# Patient Record
Sex: FEMALE | Race: BLACK OR AFRICAN AMERICAN | NOT HISPANIC OR LATINO | Employment: FULL TIME | URBAN - METROPOLITAN AREA
[De-identification: names, ages, dates, MRNs, and addresses within clinical notes are randomized per-mention and may not be internally consistent; named-entity substitution may affect disease eponyms.]

---

## 2020-09-29 ENCOUNTER — LAB VISIT (OUTPATIENT)
Dept: PRIMARY CARE CLINIC | Facility: OTHER | Age: 70
End: 2020-09-29
Attending: INTERNAL MEDICINE
Payer: OTHER GOVERNMENT

## 2020-09-29 DIAGNOSIS — Z03.818 ENCOUNTER FOR OBSERVATION FOR SUSPECTED EXPOSURE TO OTHER BIOLOGICAL AGENTS RULED OUT: ICD-10-CM

## 2020-09-29 PROCEDURE — U0003 INFECTIOUS AGENT DETECTION BY NUCLEIC ACID (DNA OR RNA); SEVERE ACUTE RESPIRATORY SYNDROME CORONAVIRUS 2 (SARS-COV-2) (CORONAVIRUS DISEASE [COVID-19]), AMPLIFIED PROBE TECHNIQUE, MAKING USE OF HIGH THROUGHPUT TECHNOLOGIES AS DESCRIBED BY CMS-2020-01-R: HCPCS

## 2020-09-30 LAB — SARS-COV-2 RNA RESP QL NAA+PROBE: NOT DETECTED

## 2020-10-20 ENCOUNTER — LAB VISIT (OUTPATIENT)
Dept: PRIMARY CARE CLINIC | Facility: OTHER | Age: 70
End: 2020-10-20
Attending: INTERNAL MEDICINE
Payer: OTHER GOVERNMENT

## 2020-10-20 DIAGNOSIS — Z03.818 ENCOUNTER FOR OBSERVATION FOR SUSPECTED EXPOSURE TO OTHER BIOLOGICAL AGENTS RULED OUT: ICD-10-CM

## 2020-10-20 PROCEDURE — U0003 INFECTIOUS AGENT DETECTION BY NUCLEIC ACID (DNA OR RNA); SEVERE ACUTE RESPIRATORY SYNDROME CORONAVIRUS 2 (SARS-COV-2) (CORONAVIRUS DISEASE [COVID-19]), AMPLIFIED PROBE TECHNIQUE, MAKING USE OF HIGH THROUGHPUT TECHNOLOGIES AS DESCRIBED BY CMS-2020-01-R: HCPCS

## 2020-10-22 LAB — SARS-COV-2 RNA RESP QL NAA+PROBE: NOT DETECTED

## 2020-11-17 ENCOUNTER — LAB VISIT (OUTPATIENT)
Dept: PRIMARY CARE CLINIC | Facility: OTHER | Age: 70
End: 2020-11-17
Attending: INTERNAL MEDICINE
Payer: OTHER GOVERNMENT

## 2020-11-17 DIAGNOSIS — Z03.818 ENCOUNTER FOR OBSERVATION FOR SUSPECTED EXPOSURE TO OTHER BIOLOGICAL AGENTS RULED OUT: ICD-10-CM

## 2020-11-17 PROCEDURE — U0003 INFECTIOUS AGENT DETECTION BY NUCLEIC ACID (DNA OR RNA); SEVERE ACUTE RESPIRATORY SYNDROME CORONAVIRUS 2 (SARS-COV-2) (CORONAVIRUS DISEASE [COVID-19]), AMPLIFIED PROBE TECHNIQUE, MAKING USE OF HIGH THROUGHPUT TECHNOLOGIES AS DESCRIBED BY CMS-2020-01-R: HCPCS

## 2020-11-20 LAB — SARS-COV-2 RNA RESP QL NAA+PROBE: NOT DETECTED

## 2020-12-15 ENCOUNTER — LAB VISIT (OUTPATIENT)
Dept: PRIMARY CARE CLINIC | Facility: OTHER | Age: 70
End: 2020-12-15
Attending: INTERNAL MEDICINE
Payer: OTHER GOVERNMENT

## 2020-12-15 DIAGNOSIS — Z03.818 ENCOUNTER FOR OBSERVATION FOR SUSPECTED EXPOSURE TO OTHER BIOLOGICAL AGENTS RULED OUT: ICD-10-CM

## 2020-12-15 PROCEDURE — U0003 INFECTIOUS AGENT DETECTION BY NUCLEIC ACID (DNA OR RNA); SEVERE ACUTE RESPIRATORY SYNDROME CORONAVIRUS 2 (SARS-COV-2) (CORONAVIRUS DISEASE [COVID-19]), AMPLIFIED PROBE TECHNIQUE, MAKING USE OF HIGH THROUGHPUT TECHNOLOGIES AS DESCRIBED BY CMS-2020-01-R: HCPCS

## 2020-12-17 LAB — SARS-COV-2 RNA RESP QL NAA+PROBE: NOT DETECTED

## 2021-01-12 ENCOUNTER — LAB VISIT (OUTPATIENT)
Dept: PRIMARY CARE CLINIC | Facility: OTHER | Age: 71
End: 2021-01-12
Payer: OTHER GOVERNMENT

## 2021-01-12 DIAGNOSIS — Z20.822 ENCOUNTER FOR LABORATORY TESTING FOR COVID-19 VIRUS: ICD-10-CM

## 2021-01-12 PROCEDURE — U0003 INFECTIOUS AGENT DETECTION BY NUCLEIC ACID (DNA OR RNA); SEVERE ACUTE RESPIRATORY SYNDROME CORONAVIRUS 2 (SARS-COV-2) (CORONAVIRUS DISEASE [COVID-19]), AMPLIFIED PROBE TECHNIQUE, MAKING USE OF HIGH THROUGHPUT TECHNOLOGIES AS DESCRIBED BY CMS-2020-01-R: HCPCS

## 2021-01-13 LAB — SARS-COV-2 RNA RESP QL NAA+PROBE: NOT DETECTED

## 2021-02-09 ENCOUNTER — LAB VISIT (OUTPATIENT)
Dept: PRIMARY CARE CLINIC | Facility: OTHER | Age: 71
End: 2021-02-09
Attending: INTERNAL MEDICINE
Payer: OTHER GOVERNMENT

## 2021-02-09 DIAGNOSIS — Z20.822 ENCOUNTER FOR LABORATORY TESTING FOR COVID-19 VIRUS: ICD-10-CM

## 2021-02-09 PROCEDURE — U0003 INFECTIOUS AGENT DETECTION BY NUCLEIC ACID (DNA OR RNA); SEVERE ACUTE RESPIRATORY SYNDROME CORONAVIRUS 2 (SARS-COV-2) (CORONAVIRUS DISEASE [COVID-19]), AMPLIFIED PROBE TECHNIQUE, MAKING USE OF HIGH THROUGHPUT TECHNOLOGIES AS DESCRIBED BY CMS-2020-01-R: HCPCS

## 2021-02-10 LAB — SARS-COV-2 RNA RESP QL NAA+PROBE: NOT DETECTED

## 2021-03-09 ENCOUNTER — LAB VISIT (OUTPATIENT)
Dept: PRIMARY CARE CLINIC | Facility: OTHER | Age: 71
End: 2021-03-09
Payer: OTHER GOVERNMENT

## 2021-03-09 DIAGNOSIS — Z20.822 ENCOUNTER FOR LABORATORY TESTING FOR COVID-19 VIRUS: ICD-10-CM

## 2021-03-09 PROCEDURE — U0003 INFECTIOUS AGENT DETECTION BY NUCLEIC ACID (DNA OR RNA); SEVERE ACUTE RESPIRATORY SYNDROME CORONAVIRUS 2 (SARS-COV-2) (CORONAVIRUS DISEASE [COVID-19]), AMPLIFIED PROBE TECHNIQUE, MAKING USE OF HIGH THROUGHPUT TECHNOLOGIES AS DESCRIBED BY CMS-2020-01-R: HCPCS

## 2021-03-11 LAB — SARS-COV-2 RNA RESP QL NAA+PROBE: NOT DETECTED

## 2023-09-03 NOTE — PROGRESS NOTES
Covid swab collected.       Pt BIBA from home  2 days ago had a lumbar puncture  HA for the past 2 days  Severe nausea and vomit staring today   500 ivf  8mg Zofran  100 fentanyl

## 2024-12-19 ENCOUNTER — HOSPITAL ENCOUNTER (EMERGENCY)
Facility: HOSPITAL | Age: 74
Discharge: HOME | End: 2024-12-19
Attending: EMERGENCY MEDICINE
Payer: COMMERCIAL

## 2024-12-19 VITALS
RESPIRATION RATE: 20 BRPM | BODY MASS INDEX: 24.16 KG/M2 | WEIGHT: 145 LBS | OXYGEN SATURATION: 97 % | HEART RATE: 75 BPM | SYSTOLIC BLOOD PRESSURE: 130 MMHG | HEIGHT: 65 IN | TEMPERATURE: 98 F | DIASTOLIC BLOOD PRESSURE: 70 MMHG

## 2024-12-19 DIAGNOSIS — N20.1 URETEROLITHIASIS: ICD-10-CM

## 2024-12-19 DIAGNOSIS — R10.10 ACUTE UPPER ABDOMINAL PAIN: ICD-10-CM

## 2024-12-19 DIAGNOSIS — M54.50 ACUTE LOW BACK PAIN, UNSPECIFIED BACK PAIN LATERALITY, UNSPECIFIED WHETHER SCIATICA PRESENT: ICD-10-CM

## 2024-12-19 DIAGNOSIS — N13.2 HYDRONEPHROSIS WITH URINARY OBSTRUCTION DUE TO URETERAL CALCULUS: Primary | ICD-10-CM

## 2024-12-19 PROBLEM — R07.9 CHEST PAIN: Status: ACTIVE | Noted: 2018-11-01

## 2024-12-19 PROBLEM — E78.5 HYPERLIPIDEMIA: Status: ACTIVE | Noted: 2024-12-19

## 2024-12-19 PROBLEM — Z92.89 HISTORY OF CT SCAN: Status: ACTIVE | Noted: 2018-12-17

## 2024-12-19 LAB
ALBUMIN SERPL-MCNC: 4.5 G/DL (ref 3.3–5.5)
ALBUMIN SERPL-MCNC: 4.6 G/DL (ref 3.3–5.5)
ALP SERPL-CCNC: 64 U/L (ref 42–141)
ALP SERPL-CCNC: 70 U/L (ref 42–141)
BILIRUB SERPL-MCNC: 1.2 MG/DL (ref 0.2–1.6)
BILIRUB SERPL-MCNC: 1.2 MG/DL (ref 0.2–1.6)
BILIRUBIN, POC UA: NEGATIVE
BLOOD, POC UA: ABNORMAL
BUN SERPL-MCNC: 16 MG/DL (ref 7–22)
CALCIUM SERPL-MCNC: 10.9 MG/DL (ref 8–10.3)
CHLORIDE SERPL-SCNC: 103 MMOL/L (ref 98–108)
CLARITY, UA: CLEAR
COLOR, UA: YELLOW
CREAT SERPL-MCNC: 0.9 MG/DL (ref 0.6–1.2)
GLUCOSE SERPL-MCNC: 157 MG/DL (ref 73–118)
GLUCOSE, POC UA: NEGATIVE
HCT, POC: NORMAL
HGB, POC: NORMAL (ref 14–18)
KETONES, POC UA: NEGATIVE
LEUKOCYTE EST, POC UA: NEGATIVE
MCH, POC: NORMAL
MCHC, POC: NORMAL
MCV, POC: NORMAL
MPV, POC: NORMAL
NITRITE, POC UA: NEGATIVE
PH UR STRIP: 7 [PH] (ref 5–8)
POC ALT (SGPT): 14 U/L (ref 10–47)
POC ALT (SGPT): 18 U/L (ref 10–47)
POC AMYLASE: 68 U/L (ref 14–97)
POC AST (SGOT): 25 U/L (ref 11–38)
POC AST (SGOT): 27 U/L (ref 11–38)
POC B-TYPE NATRIURETIC PEPTIDE: <5 PG/ML (ref 0–100)
POC CARDIAC TROPONIN I: 0 NG/ML (ref 0–0.08)
POC GGT: 16 U/L (ref 5–65)
POC PLATELET COUNT: NORMAL
POC TCO2: 28 MMOL/L (ref 18–33)
POTASSIUM BLD-SCNC: 3.9 MMOL/L (ref 3.6–5.1)
PROTEIN, POC UA: NEGATIVE
PROTEIN, POC: 8 G/DL (ref 6.4–8.1)
PROTEIN, POC: 8.1 G/DL (ref 6.4–8.1)
RBC, POC: NORMAL
RDW, POC: NORMAL
SAMPLE: NORMAL
SODIUM BLD-SCNC: 143 MMOL/L (ref 128–145)
SPECIFIC GRAVITY, POC UA: 1.01 (ref 1–1.03)
UROBILINOGEN, POC UA: 0.2 E.U./DL
WBC, POC: NORMAL

## 2024-12-19 PROCEDURE — 25500020 PHARM REV CODE 255: Mod: ER

## 2024-12-19 PROCEDURE — 84484 ASSAY OF TROPONIN QUANT: CPT | Mod: ER

## 2024-12-19 PROCEDURE — 93010 ELECTROCARDIOGRAM REPORT: CPT | Mod: ,,, | Performed by: INTERNAL MEDICINE

## 2024-12-19 PROCEDURE — 63600175 PHARM REV CODE 636 W HCPCS: Mod: ER | Performed by: EMERGENCY MEDICINE

## 2024-12-19 PROCEDURE — 83880 ASSAY OF NATRIURETIC PEPTIDE: CPT | Mod: ER

## 2024-12-19 PROCEDURE — 25000003 PHARM REV CODE 250: Mod: ER | Performed by: EMERGENCY MEDICINE

## 2024-12-19 PROCEDURE — 96374 THER/PROPH/DIAG INJ IV PUSH: CPT | Mod: ER

## 2024-12-19 PROCEDURE — 96375 TX/PRO/DX INJ NEW DRUG ADDON: CPT | Mod: ER

## 2024-12-19 PROCEDURE — 80053 COMPREHEN METABOLIC PANEL: CPT | Mod: ER

## 2024-12-19 PROCEDURE — 85025 COMPLETE CBC W/AUTO DIFF WBC: CPT | Mod: ER

## 2024-12-19 PROCEDURE — 93005 ELECTROCARDIOGRAM TRACING: CPT | Mod: ER

## 2024-12-19 PROCEDURE — 99285 EMERGENCY DEPT VISIT HI MDM: CPT | Mod: 25,ER

## 2024-12-19 PROCEDURE — 82040 ASSAY OF SERUM ALBUMIN: CPT | Mod: 59,ER

## 2024-12-19 RX ORDER — TAMSULOSIN HYDROCHLORIDE 0.4 MG/1
0.4 CAPSULE ORAL
Status: COMPLETED | OUTPATIENT
Start: 2024-12-19 | End: 2024-12-19

## 2024-12-19 RX ORDER — TAMSULOSIN HYDROCHLORIDE 0.4 MG/1
0.4 CAPSULE ORAL DAILY
Qty: 30 CAPSULE | Refills: 0 | Status: SHIPPED | OUTPATIENT
Start: 2024-12-19 | End: 2025-01-18

## 2024-12-19 RX ORDER — KETOROLAC TROMETHAMINE 10 MG/1
10 TABLET, FILM COATED ORAL EVERY 6 HOURS PRN
Qty: 12 TABLET | Refills: 0 | Status: SHIPPED | OUTPATIENT
Start: 2024-12-19 | End: 2024-12-22

## 2024-12-19 RX ORDER — ONDANSETRON 8 MG/1
8 TABLET, ORALLY DISINTEGRATING ORAL EVERY 6 HOURS PRN
Qty: 12 TABLET | Refills: 0 | Status: SHIPPED | OUTPATIENT
Start: 2024-12-19 | End: 2024-12-30

## 2024-12-19 RX ORDER — KETOROLAC TROMETHAMINE 30 MG/ML
15 INJECTION, SOLUTION INTRAMUSCULAR; INTRAVENOUS
Status: COMPLETED | OUTPATIENT
Start: 2024-12-19 | End: 2024-12-19

## 2024-12-19 RX ORDER — ONDANSETRON HYDROCHLORIDE 2 MG/ML
4 INJECTION, SOLUTION INTRAVENOUS
Status: COMPLETED | OUTPATIENT
Start: 2024-12-19 | End: 2024-12-19

## 2024-12-19 RX ORDER — METOCLOPRAMIDE HYDROCHLORIDE 5 MG/ML
10 INJECTION INTRAMUSCULAR; INTRAVENOUS
Status: COMPLETED | OUTPATIENT
Start: 2024-12-19 | End: 2024-12-19

## 2024-12-19 RX ORDER — METHOCARBAMOL 750 MG/1
1500 TABLET, FILM COATED ORAL
Status: COMPLETED | OUTPATIENT
Start: 2024-12-19 | End: 2024-12-19

## 2024-12-19 RX ADMIN — TAMSULOSIN HYDROCHLORIDE 0.4 MG: 0.4 CAPSULE ORAL at 11:12

## 2024-12-19 RX ADMIN — METHOCARBAMOL TABLETS 1500 MG: 750 TABLET, COATED ORAL at 10:12

## 2024-12-19 RX ADMIN — KETOROLAC TROMETHAMINE 15 MG: 30 INJECTION, SOLUTION INTRAMUSCULAR; INTRAVENOUS at 08:12

## 2024-12-19 RX ADMIN — ONDANSETRON 4 MG: 2 INJECTION INTRAMUSCULAR; INTRAVENOUS at 08:12

## 2024-12-19 RX ADMIN — METOCLOPRAMIDE HYDROCHLORIDE 10 MG: 5 INJECTION INTRAMUSCULAR; INTRAVENOUS at 08:12

## 2024-12-19 RX ADMIN — IOHEXOL 75 ML: 350 INJECTION, SOLUTION INTRAVENOUS at 08:12

## 2024-12-20 NOTE — ED NOTES
Pt ambulated to restroom with steady gait noted. Pt states she is feeling better. Vitals updated all stable. Pt updated on plan of care and aware we are waiting on CT results and urinalysis.

## 2024-12-20 NOTE — DISCHARGE INSTRUCTIONS
Strain all urine until stone passes.  Drink plenty of electrolyte-rich fluids (at least one gallon or more daily).  Limit/avoid caffeine (such as coffee, tea, Coke, Coke Zero, Pepsi, Root Beer, Dr .Pepper, Mountain Dew, energy drinks, pre-workout supplements, and many others.) and alcohol intake while symptoms persist.

## 2024-12-20 NOTE — ED PROVIDER NOTES
Encounter Date: 12/19/2024    SCRIBE #1 NOTE: I, Alberto Norris Do, am scribing for, and in the presence of,  Leann Rome MD. I have scribed the following portions of the note - the EKG reading. Other sections scribed: HPI, ROS, PE.       History     Chief Complaint   Patient presents with    Abdominal Pain     Severe generalized abdominal pain and lower back pain associated with N/V x3 hours.      74 y.o. female with no known medical problems, presents to the ED with complaints of acute severe low middle back pain onset about 3 hours ago. Per sister, independent historian, she reports nausea, emesis (greater than 10x since 2.5 hours ago), brown colored diarrhea (4x), and mild epigastric abdominal pain. Patient reports some dark coffee ground emesis. She notes feeling normal this morning. She denies previous episodes of back pain. Sister notes the patient was sitting at a desk at work prior to this episode. She notes her last meal was a turkey sandwich from [facility] about 2 hours prior to this episode, reports she is unsure if the food was spoiled. She denies any known sick contacts. Patient denies any daily prescribed medications. Patient denies any constipation, dysuria, hemoptysis, urinary frequency, urinary urgency, hematuria, SOB, melena, or hematochezia. She denies smoking. She denies any previous abdominal surgery.     The history is provided by the patient and a relative. No  was used.     Review of patient's allergies indicates:   Allergen Reactions    Codeine Rash     History reviewed. No pertinent past medical history.  History reviewed. No pertinent surgical history.  No family history on file.  Social History     Tobacco Use    Smoking status: Unknown     Review of Systems   Respiratory:  Negative for shortness of breath.    Gastrointestinal:  Positive for abdominal pain, diarrhea, nausea and vomiting. Negative for blood in stool and constipation.   Genitourinary:  Negative for  dysuria, frequency, hematuria and urgency.   Musculoskeletal:  Positive for back pain.   All other systems reviewed and are negative.      Physical Exam     Initial Vitals [12/19/24 1932]   BP Pulse Resp Temp SpO2   124/85 85 (!) 24 97.8 °F (36.6 °C) 100 %      MAP       --         Physical Exam    Nursing note and vitals reviewed.  Constitutional: She appears well-developed and well-nourished. She is not diaphoretic. No distress.   HENT:   Head: Normocephalic and atraumatic.   Right Ear: External ear normal.   Left Ear: External ear normal.   Nose: Nose normal.   Eyes: Conjunctivae and lids are normal.   Neck: Trachea normal and phonation normal. Neck supple. No stridor present.   Normal range of motion.  Cardiovascular:  Normal rate, regular rhythm and normal heart sounds.           No murmur heard.  Pulmonary/Chest: Breath sounds normal. No accessory muscle usage or stridor. No tachypnea. No respiratory distress.   Hyperventilating.    Abdominal: She exhibits no distension. There is abdominal tenderness in the epigastric area.   Musculoskeletal:         General: Normal range of motion.      Cervical back: Normal range of motion and neck supple.      Lumbar back: Tenderness (Bilateral) present. No deformity or bony tenderness. Normal range of motion.     Neurological: She is alert. Gait normal.   Skin: Skin is warm and dry. No rash noted.   Psychiatric: Her mood appears anxious.         ED Course   Procedures  Labs Reviewed   POCT CMP - Abnormal       Result Value    Albumin, POC 4.5      Alkaline Phosphatase, POC 64      ALT (SGPT), POC 18      AST (SGOT), POC 27      POC BUN 16      Calcium, POC 10.9 (*)     POC Chloride 103      POC Creatinine 0.9      POC Glucose 157 (*)     POC Potassium 3.9      POC Sodium 143      Bilirubin, POC 1.2      POC TCO2 28      Protein, POC 8.1     POCT URINALYSIS W/O SCOPE - Abnormal    Glucose, UA Negative      Bilirubin, UA Negative      Ketones, UA Negative      Spec Grav UA  1.015      Blood, UA Trace-intact (*)     PH, UA 7.0      Protein, UA Negative      Urobilinogen, UA 0.2      Nitrite, UA Negative      Leukocytes, UA Negative      Color, UA POC Yellow      Clarity, UA, POC Clear     TROPONIN ISTAT    POC Cardiac Troponin I 0.00      Sample unknown     POCT CBC    Hematocrit        Hemoglobin        RBC        WBC        MCV        MCH, POC        MCHC        RDW-CV        Platelet Count, POC        MPV       POCT CMP   POCT LIVER PANEL   POCT TROPONIN   POCT B-TYPE NATRIURETIC PEPTIDE (BNP)   POCT URINALYSIS W/O SCOPE   POCT LIVER PANEL    Albumin, POC 4.6      Alkaline Phosphatase, POC 70      ALT (SGPT), POC 14      Amylase, POC 68      AST (SGOT), POC 25      POC GGT 16      Bilirubin, POC 1.2      Protein, POC 8.0     POCT B-TYPE NATRIURETIC PEPTIDE (BNP)    POC B-Type Natriuretic Peptide <5.0       EKG Readings: (Independently Interpreted)   Initial Reading: No STEMI. Rhythm: Normal Sinus Rhythm. Heart Rate: 78. Ectopy: No Ectopy. Conduction: Normal. ST Segments: Normal ST Segments. T Waves: Normal. Axis: Left Axis Deviation. Clinical Impression: Normal Sinus Rhythm     ECG Results              EKG 12-lead (Final result)        Collection Time Result Time QRS Duration [facility]HS QTC Calculation    12/19/24 19:44:20 12/21/24 12:16:44 68 414                     Final result by Interface, Lab In [facility] (12/21/24 12:16:51)                   Narrative:    Test Reason : R10.10,    Vent. Rate :  78 BPM     Atrial Rate :  78 BPM     P-R Int : 206 ms          QRS Dur :  68 ms      QT Int : 364 ms       P-R-T Axes :  58  -5  21 degrees    QTcB Int : 414 ms    Normal sinus rhythm  Normal ECG  No previous ECGs available  Confirmed by Jimbo Downs ([id number]) on 12/21/2024 12:16:42 PM    Referred By: AAAREFERRAL SELF           Confirmed By: Jimbo Downs                                  Imaging Results              CT Abdomen Pelvis With IV Contrast NO Oral Contrast (Final result)  Result time  12/19/24 22:19:15      Final result by Melissa Back MD (12/19/24 22:19:15)                   Impression:      Distal right ureteral calculi.  Associated severe right hydroureteronephrosis.  Right nephrolithiasis.    Too small to characterize low attenuation lesion in bilateral renal cortices and the left hepatic lobe.  Probable subcentimeter renal and left hepatic cysts.    Foci of intraluminal air seen involving the distal esophagus.  Findings may represent esophageal diverticulum.  Ulceration or air trapped in rugal folds may have a similar appearance.      Electronically signed by: Melissa Back  Date:    12/19/2024  Time:    22:19               Narrative:    EXAMINATION:  CT OF ABDOMEN PELVIS WITH    CLINICAL HISTORY:  Nausea/vomiting;Abdominal pain, acute, nonlocalized;    TECHNIQUE:  5 mm enhanced axial images were obtained from the lung bases through the greater trochanters.  Seventy-five mL of Omnipaque 350 was injected.    COMPARISON:  None.    FINDINGS:  There is severe right renal hydroureteronephrosis.  Multiple right distal ureteral calculi are present.  One of the largest measures 4 x 7 mm (series 2 axial image 67).  Calcifications or calculi are also seen within the right kidney.  There are too small to characterize low attenuation lesions seen in bilateral renal cortices.    A too small to characterize low attenuation lesion is seen in the left lobe of the liver.    Spleen, pancreas, and adrenal glands are unremarkable. The gallbladder contains no calcified gallstones.    There is no definite evidence for abdominal adenopathy or ascites.    There are no pelvic masses or adenopathy.    There is no free fluid in the pelvis.    There are tiny foci of intraluminal air seen involving the esophagus at the GE junction.  There is mild bibasilar atelectasis.    There is mild levoscoliosis.  Spondylitic changes are greatest at L3 through L5.                                       Medications    ondansetron injection 4 mg (4 mg Intravenous Given 12/19/24 2001)   ketorolac injection 15 mg (15 mg Intravenous Given 12/19/24 2018)   metoclopramide injection 10 mg (10 mg Intravenous Given 12/19/24 2018)   iohexoL (OMNIPAQUE 350) injection 100 mL (75 mLs Intravenous Given 12/19/24 2043)   methocarbamoL tablet 1,500 mg (1,500 mg Oral Given 12/19/24 2246)   tamsulosin 24 hr capsule 0.4 mg (0.4 mg Oral Given 12/19/24 2300)     Medical Decision Making  Amount and/or Complexity of Data Reviewed  Independent Historian: caregiver     Details: See HPI.  Labs: ordered. Decision-making details documented in ED Course.  Radiology: ordered. Decision-making details documented in ED Course.  ECG/medicine tests: ordered and independent interpretation performed. Decision-making details documented in ED Course.    Risk  Prescription drug management.    Labs Reviewed  Pertinent lab and imaging findings include:   White count 14, H&H 15 and 45, urinalysis with trace hematuria, BNP 5.0, troponin 0.00, creatinine 0.9, liver panel unremarkable, CT abdomen pelvis with distal right ureteral calculi, severe right hydroureteronephrosis, right nephrolithiasis, and other incidental findings over the liver and distal esophagus      Admission on 12/19/2024, Discharged on 12/19/2024   Component Date Value Ref Range Status    QRS Duration 12/19/2024 68  ms Final    OHS QTC Calculation 12/19/2024 414  ms Final    Albumin, POC 12/19/2024 4.6  3.3 - 5.5 g/dL Final    Alkaline Phosphatase, POC 12/19/2024 70  42 - 141 U/L Final    ALT (SGPT), POC 12/19/2024 14  10 - 47 U/L Final    Amylase, POC 12/19/2024 68  14 - 97 U/L Final    AST (SGOT), POC 12/19/2024 25  11 - 38 U/L Final    POC GGT 12/19/2024 16  5 - 65 U/L Final    Bilirubin, POC 12/19/2024 1.2  0.2 - 1.6 mg/dL Final    Protein, POC 12/19/2024 8.0  6.4 - 8.1 g/dL Final    Albumin, POC 12/19/2024 4.5  3.3 - 5.5 g/dL Final    Alkaline Phosphatase, POC 12/19/2024 64  42 - 141 U/L Final    ALT  (SGPT), POC 12/19/2024 18  10 - 47 U/L Final    AST (SGOT), POC 12/19/2024 27  11 - 38 U/L Final    POC BUN 12/19/2024 16  7 - 22 mg/dL Final    Calcium, POC 12/19/2024 10.9 (H)  8.0 - 10.3 mg/dL Final    POC Chloride 12/19/2024 103  98 - 108 mmol/L Final    POC Creatinine 12/19/2024 0.9  0.6 - 1.2 mg/dL Final    POC Glucose 12/19/2024 157 (H)  73 - 118 mg/dL Final    POC Potassium 12/19/2024 3.9  3.6 - 5.1 mmol/L Final    POC Sodium 12/19/2024 143  128 - 145 mmol/L Final    Bilirubin, POC 12/19/2024 1.2  0.2 - 1.6 mg/dL Final    POC TCO2 12/19/2024 28  18 - 33 mmol/L Final    Protein, POC 12/19/2024 8.1  6.4 - 8.1 g/dL Final    POC Cardiac Troponin I 12/19/2024 0.00  0.00 - 0.08 ng/mL Final    Sample 12/19/2024 unknown   Final    Comment: A single negative troponin is insufficient to rule out myocardial infarction.  The use of a serial sampling protocol is recommended practice. Correlate results with reference intervals established for methodology used. Point of care and core laboratory   troponin results are not interchangeable.      POC B-Type Natriuretic Peptide 12/19/2024 <5.0  0.0 - 100.0 pg/mL Final    Glucose, UA 12/19/2024 Negative  Negative Final    Bilirubin, UA 12/19/2024 Negative  Negative Final    Ketones, UA 12/19/2024 Negative  Negative Final    Spec Grav UA 12/19/2024 1.015  1.005 - 1.030 Final    Blood, UA 12/19/2024 Trace-intact (A)  Negative Final    PH, UA 12/19/2024 7.0  5.0 - 8.0 Final    Protein, UA 12/19/2024 Negative  Negative Final    Urobilinogen, UA 12/19/2024 0.2  <=1.0 E.U./dL Final    Nitrite, UA 12/19/2024 Negative  Negative Final    Leukocytes, UA 12/19/2024 Negative  Negative Final    Color, UA POC 12/19/2024 Yellow  Yellow, Straw, Isabel Final    Clarity, UA, POC 12/19/2024 Clear  Clear Final        Imaging Reviewed    Imaging Results              CT Abdomen Pelvis With IV Contrast NO Oral Contrast (Final result)  Result time 12/19/24 22:19:15      Final result by Melissa Back  MD TRI (12/19/24 22:19:15)                   Impression:      Distal right ureteral calculi.  Associated severe right hydroureteronephrosis.  Right nephrolithiasis.    Too small to characterize low attenuation lesion in bilateral renal cortices and the left hepatic lobe.  Probable subcentimeter renal and left hepatic cysts.    Foci of intraluminal air seen involving the distal esophagus.  Findings may represent esophageal diverticulum.  Ulceration or air trapped in rugal folds may have a similar appearance.      Electronically signed by: Melissa Back  Date:    12/19/2024  Time:    22:19               Narrative:    EXAMINATION:  CT OF ABDOMEN PELVIS WITH    CLINICAL HISTORY:  Nausea/vomiting;Abdominal pain, acute, nonlocalized;    TECHNIQUE:  5 mm enhanced axial images were obtained from the lung bases through the greater trochanters.  Seventy-five mL of Omnipaque 350 was injected.    COMPARISON:  None.    FINDINGS:  There is severe right renal hydroureteronephrosis.  Multiple right distal ureteral calculi are present.  One of the largest measures 4 x 7 mm (series 2 axial image 67).  Calcifications or calculi are also seen within the right kidney.  There are too small to characterize low attenuation lesions seen in bilateral renal cortices.    A too small to characterize low attenuation lesion is seen in the left lobe of the liver.    Spleen, pancreas, and adrenal glands are unremarkable. The gallbladder contains no calcified gallstones.    There is no definite evidence for abdominal adenopathy or ascites.    There are no pelvic masses or adenopathy.    There is no free fluid in the pelvis.    There are tiny foci of intraluminal air seen involving the esophagus at the GE junction.  There is mild bibasilar atelectasis.    There is mild levoscoliosis.  Spondylitic changes are greatest at L3 through L5.                                      Medications given in ED    Medications   ondansetron injection 4 mg (4 mg  Intravenous Given 12/19/24 2001)   ketorolac injection 15 mg (15 mg Intravenous Given 12/19/24 2018)   metoclopramide injection 10 mg (10 mg Intravenous Given 12/19/24 2018)   iohexoL (OMNIPAQUE 350) injection 100 mL (75 mLs Intravenous Given 12/19/24 2043)   methocarbamoL tablet 1,500 mg (1,500 mg Oral Given 12/19/24 2246)   tamsulosin 24 hr capsule 0.4 mg (0.4 mg Oral Given 12/19/24 2300)         Note was created using voice recognition software. Note may have occasional typographical errors that may not have been identified and edited despite good juan initial review prior to signing.    I, Leann Rome MD, personally performed the services described in this documentation. All medical record entries made by the scribe were at my direction and in my presence.  I have reviewed the chart and agree that the record reflects my personal performance and is accurate and complete.            Scribe Attestation:   Scribe #1: I performed the above scribed service and the documentation accurately describes the services I performed. I attest to the accuracy of the note.                   Medical Decision Making:   Differential Diagnosis:   Nephrolithiasis, pyelonephritis,  Back spasm,  degenerative disc disease, disc herniation,  Retrocecal appendicitis, and others.    Clinical Tests:   Lab Tests: Reviewed and Ordered  Radiological Study: Ordered and Reviewed  ED Management:  Pain improved with ED treatment.  Lab results, imaging results, outpatient management plan, outpatient PCP/neurology follow-up and ED return precautions discussed with patient with understanding and agreement.             Clinical Impression:  Final diagnoses:  [R10.10] Acute upper abdominal pain  [N13.2] Hydronephrosis with urinary obstruction due to ureteral calculus (Primary)  [M54.50] Acute low back pain, unspecified back pain laterality, unspecified whether sciatica present  [N20.1] Ureterolithiasis          ED Disposition Condition    Discharge  Stable          ED Prescriptions       Medication Sig Dispense Start Date End Date Auth. Provider    tamsulosin (FLOMAX) 0.4 mg Cap Take 1 capsule (0.4 mg total) by mouth once daily. 30 capsule 2024 Leann Rome MD    ondansetron (ZOFRAN-ODT) 8 MG TbDL Take 1 tablet (8 mg total) by mouth every 6 (six) hours as needed (nausea and vomiting). 12 tablet 2024 -- Leann Rome MD    ketorolac (TORADOL) 10 mg tablet () Take 1 tablet (10 mg total) by mouth every 6 (six) hours as needed for Pain (take with food). 12 tablet 2024 Leann Rome MD          Follow-up Information       Follow up With Specialties Details Why Contact Info    The nearest emergency department.  Go to  As needed, If symptoms worsen     Your PCP  Call  As needed, for ongoing care              Leann Rome MD  [phone]

## 2024-12-20 NOTE — ED NOTES
Patient identifiers verified and correct for Alize Vernon  LOC: The patient is awake, alert and aware of environment with an appropriate affect, the patient is oriented x 3 and speaking appropriately.   APPEARANCE: Patient is clean and well groomed.  SKIN: The skin is warm and dry, color consistent with ethnicity, patient has normal skin turgor and moist mucus membranes, skin intact, no breakdown or bruising noted.   MUSCULOSKELETAL: Patient moving all extremities spontaneously, no swelling noted.  RESPIRATORY: Airway is open and patent, respirations are spontaneous, patient has a normal effort and rate, no accessory muscle use noted, pt placed on continuous pulse ox with O2 sats noted at 97% on room air.  CARDIAC: Pt placed on cardiac monitor. Patient has a normal rate and regular rhythm, no edema noted, capillary refill < 3 seconds.   GASTRO: Soft, no distention noted, normoactive bowel sounds present in all four quadrants. See below for further assessment.   : Pt denies any pain or frequency with urination.  NEURO: Pt opens eyes spontaneously, behavior appropriate to situation, follows commands, facial expression symmetrical, bilateral hand grasp equal and even, purposeful motor response noted, normal sensation in all extremities when touched with a finger.    Pt presents to ER with c/o abdominal pain that radiates to her back with vomiting and diarrhea that started 3 hours pta. Denies any fever. Denies any chest pain or any cardiac hx.

## 2024-12-21 LAB
OHS QRS DURATION: 68 MS
OHS QTC CALCULATION: 414 MS

## 2024-12-26 ENCOUNTER — TELEPHONE (OUTPATIENT)
Dept: UROLOGY | Facility: CLINIC | Age: 74
End: 2024-12-26
Payer: COMMERCIAL

## 2024-12-26 NOTE — TELEPHONE ENCOUNTER
----- Message from [employer] sent at 12/26/2024 11:58 AM CST -----  Type:  Sooner Apoointment Request    Caller is requesting a sooner appointment.  Caller declined first available appointment listed below.  Caller will not accept being placed on the waitlist and is requesting a message be sent to doctor.  Name of Caller:pt  When is the first available appointment?1/17  Symptoms:kidney stones  Would the patient rather a call back or a response via [facility]? call  Best Call Back Number:[phone] (work) [phone] if no answer call work line   Additional Information: pt was admitted to Ed on 12/19 and needs to be seen asap

## 2024-12-30 ENCOUNTER — OFFICE VISIT (OUTPATIENT)
Dept: UROLOGY | Facility: CLINIC | Age: 74
End: 2024-12-30
Payer: COMMERCIAL

## 2024-12-30 VITALS
BODY MASS INDEX: 24.96 KG/M2 | WEIGHT: 149.81 LBS | DIASTOLIC BLOOD PRESSURE: 83 MMHG | HEART RATE: 85 BPM | HEIGHT: 65 IN | SYSTOLIC BLOOD PRESSURE: 149 MMHG

## 2024-12-30 DIAGNOSIS — N13.2 HYDRONEPHROSIS WITH URINARY OBSTRUCTION DUE TO URETERAL CALCULUS: ICD-10-CM

## 2024-12-30 DIAGNOSIS — R10.84 GENERALIZED ABDOMINAL PAIN: Primary | ICD-10-CM

## 2024-12-30 DIAGNOSIS — N20.1 URETEROLITHIASIS: ICD-10-CM

## 2024-12-30 DIAGNOSIS — N13.39 OTHER HYDRONEPHROSIS: ICD-10-CM

## 2024-12-30 PROCEDURE — 1159F MED LIST DOCD IN RCRD: CPT | Mod: CPTII,S$GLB,, | Performed by: UROLOGY

## 2024-12-30 PROCEDURE — 3008F BODY MASS INDEX DOCD: CPT | Mod: CPTII,S$GLB,, | Performed by: UROLOGY

## 2024-12-30 PROCEDURE — 3288F FALL RISK ASSESSMENT DOCD: CPT | Mod: CPTII,S$GLB,, | Performed by: UROLOGY

## 2024-12-30 PROCEDURE — 1125F AMNT PAIN NOTED PAIN PRSNT: CPT | Mod: CPTII,S$GLB,, | Performed by: UROLOGY

## 2024-12-30 PROCEDURE — 1160F RVW MEDS BY RX/DR IN RCRD: CPT | Mod: CPTII,S$GLB,, | Performed by: UROLOGY

## 2024-12-30 PROCEDURE — 3077F SYST BP >= 140 MM HG: CPT | Mod: CPTII,S$GLB,, | Performed by: UROLOGY

## 2024-12-30 PROCEDURE — 99999 PR PBB SHADOW E&M-EST. PATIENT-LVL IV: CPT | Mod: PBBFAC,,, | Performed by: UROLOGY

## 2024-12-30 PROCEDURE — 3079F DIAST BP 80-89 MM HG: CPT | Mod: CPTII,S$GLB,, | Performed by: UROLOGY

## 2024-12-30 PROCEDURE — 1101F PT FALLS ASSESS-DOCD LE1/YR: CPT | Mod: CPTII,S$GLB,, | Performed by: UROLOGY

## 2024-12-30 PROCEDURE — 99204 OFFICE O/P NEW MOD 45 MIN: CPT | Mod: S$GLB,,, | Performed by: UROLOGY

## 2024-12-30 RX ORDER — KETOROLAC TROMETHAMINE 10 MG/1
10 TABLET, FILM COATED ORAL EVERY 6 HOURS PRN
Qty: 20 TABLET | Refills: 0 | Status: SHIPPED | OUTPATIENT
Start: 2024-12-30 | End: 2025-01-04

## 2024-12-30 RX ORDER — ONDANSETRON 4 MG/1
4 TABLET, ORALLY DISINTEGRATING ORAL ONCE
Qty: 1 TABLET | Refills: 0 | Status: SHIPPED | OUTPATIENT
Start: 2024-12-30 | End: 2024-12-30

## 2024-12-30 RX ORDER — TRAMADOL HYDROCHLORIDE 50 MG/1
50 TABLET ORAL EVERY 4 HOURS PRN
COMMUNITY

## 2024-12-30 NOTE — PROGRESS NOTES
"[facility]   Clinic Note    SUBJECTIVE:     Chief Complaint   Patient presents with    Nephrolithiasis       Referral from: Leann Rome MD.    History of Present Illness:  Alize Vernon is a 74 y.o. female who presents to clinic for right ureteral stone.    Patient is here for evaluation of a right ureteral stone.  She was previously seen in the emergency department with right colicky flank pain.  Was found to have a distal right ureteral calculi and severe right hydronephrosis.  Currently states the pain is relatively well-controlled.  Reports that she has had stones in the past.  No fevers.  No chills.    Patient endorses no additional complaints at this time.    History reviewed. No pertinent past medical history.    Past Surgical History:   Procedure Laterality Date    LITHOTRIPSY         No family history on file.    Social History     Tobacco Use    Smoking status: Unknown       Current Outpatient Medications on File Prior to Visit   Medication Sig Dispense Refill    tamsulosin (FLOMAX) 0.4 mg Cap Take 1 capsule (0.4 mg total) by mouth once daily. 30 capsule 0    traMADoL (ULTRAM) 50 mg tablet Take 50 mg by mouth every 4 (four) hours as needed.      [DISCONTINUED] ondansetron (ZOFRAN-ODT) 8 MG TbDL Take 1 tablet (8 mg total) by mouth every 6 (six) hours as needed (nausea and vomiting). (Patient not taking: Reported on 12/30/2024) 12 tablet 0     No current facility-administered medications on file prior to visit.       Review of patient's allergies indicates:   Allergen Reactions    Codeine Rash       Review of Systems:  A review of 10+ systems was conducted with pertinent positive and negative findings documented in HPI with all other systems reviewed and negative.    OBJECTIVE:     Estimated body mass index is 24.93 kg/m² as calculated from the following:    Height as of this encounter: 5' 5" (1.651 m).    Weight as of this encounter: 67.9 kg (149 lb 12.8 oz).    Vital Signs (Most Recent)  Vitals:    " "12/30/24 0818   BP: (!) 149/83   Pulse: 85       Physical Exam:  GENERAL: patient sitting comfortably  HEENT: normocephalic  NECK: supple, no JVD  PULM: normal chest rise, no increased WOB  HEART: non-diaphoretic  ABDO: soft, nondistended, nontender  BACK: no CVA tenderness bilaterally  SKIN: warm, dry, well perfused  EXT: no bruising or edema  NEURO: grossly normal with no focal deficits  PSYCH: appropriate mood and affect    Genitourinary Exam:  deferred    No results found for: "BUN", "CREATININE", "GFRNONAA", "GFRAA", "WBC", "HGB", "HCT", "PLT", "AST", "ALT", "ALKPHOS", "ALBUMIN", "HGBA1C", "PT", "PTT", "INR"     Imaging:  I have personally reviewed all relevant imaging studies.    Results for orders placed or performed during the hospital encounter of 12/19/24 (from the past 2160 hours)   CT Abdomen Pelvis With IV Contrast NO Oral Contrast    Narrative    EXAMINATION:  CT OF ABDOMEN PELVIS WITH    CLINICAL HISTORY:  Nausea/vomiting;Abdominal pain, acute, nonlocalized;    TECHNIQUE:  5 mm enhanced axial images were obtained from the lung bases through the greater trochanters.  Seventy-five mL of Omnipaque 350 was injected.    COMPARISON:  None.    FINDINGS:  There is severe right renal hydroureteronephrosis.  Multiple right distal ureteral calculi are present.  One of the largest measures 4 x 7 mm (series 2 axial image 67).  Calcifications or calculi are also seen within the right kidney.  There are too small to characterize low attenuation lesions seen in bilateral renal cortices.    A too small to characterize low attenuation lesion is seen in the left lobe of the liver.    Spleen, pancreas, and adrenal glands are unremarkable. The gallbladder contains no calcified gallstones.    There is no definite evidence for abdominal adenopathy or ascites.    There are no pelvic masses or adenopathy.    There is no free fluid in the pelvis.    There are tiny foci of intraluminal air seen involving the esophagus at the GE " junction.  There is mild bibasilar atelectasis.    There is mild levoscoliosis.  Spondylitic changes are greatest at L3 through L5.      Impression    Distal right ureteral calculi.  Associated severe right hydroureteronephrosis.  Right nephrolithiasis.    Too small to characterize low attenuation lesion in bilateral renal cortices and the left hepatic lobe.  Probable subcentimeter renal and left hepatic cysts.    Foci of intraluminal air seen involving the distal esophagus.  Findings may represent esophageal diverticulum.  Ulceration or air trapped in rugal folds may have a similar appearance.      Electronically signed by: Melissa Back  Date:    12/19/2024  Time:    22:19     No results found for this or any previous visit (from the past 2160 hours).  CT Abdomen Pelvis With IV Contrast NO Oral Contrast  Narrative: EXAMINATION:  CT OF ABDOMEN PELVIS WITH    CLINICAL HISTORY:  Nausea/vomiting;Abdominal pain, acute, nonlocalized;    TECHNIQUE:  5 mm enhanced axial images were obtained from the lung bases through the greater trochanters.  Seventy-five mL of Omnipaque 350 was injected.    COMPARISON:  None.    FINDINGS:  There is severe right renal hydroureteronephrosis.  Multiple right distal ureteral calculi are present.  One of the largest measures 4 x 7 mm (series 2 axial image 67).  Calcifications or calculi are also seen within the right kidney.  There are too small to characterize low attenuation lesions seen in bilateral renal cortices.    A too small to characterize low attenuation lesion is seen in the left lobe of the liver.    Spleen, pancreas, and adrenal glands are unremarkable. The gallbladder contains no calcified gallstones.    There is no definite evidence for abdominal adenopathy or ascites.    There are no pelvic masses or adenopathy.    There is no free fluid in the pelvis.    There are tiny foci of intraluminal air seen involving the esophagus at the GE junction.  There is mild bibasilar  "atelectasis.    There is mild levoscoliosis.  Spondylitic changes are greatest at L3 through L5.  Impression: Distal right ureteral calculi.  Associated severe right hydroureteronephrosis.  Right nephrolithiasis.    Too small to characterize low attenuation lesion in bilateral renal cortices and the left hepatic lobe.  Probable subcentimeter renal and left hepatic cysts.    Foci of intraluminal air seen involving the distal esophagus.  Findings may represent esophageal diverticulum.  Ulceration or air trapped in rugal folds may have a similar appearance.    Electronically signed by: Melissa Back  Date:    12/19/2024  Time:    22:19       PSA:  No results found for: "PSA", "PSADIAG", "PSATOTAL", "PSAFREE"    Testosterone:  No results found for: "TOTALTESTOST", "TESTOSTERONE"     ASSESSMENT     1. Generalized abdominal pain    2. Hydronephrosis with urinary obstruction due to ureteral calculus    3. Ureterolithiasis    4. Other hydronephrosis        PLAN:     Imaging reviewed.  Patient does have what appears to be severe hydronephrosis however it appears that the lower pole is atrophic however the remainder of the kidney appears normal functioning.  Additionally she has a J hooking ureter.  It is unclear if the hydronephrosis is chronic.  I discussed options including urgent surgery and removal of her calculus.  The patient is unwilling to undergo surgery at this time.  She would prefer to give this a little more time to do a trial of passage.  We will repeat imaging in 2 weeks and she has been provided with return precautions.  We will plan for surgical management after a trial of passage if imaging reveals that stone is still present    Paco Valdez MD  Urology  [facility]    Disclaimer: This note has been generated using voice-recognition software. There may be typographical errors that have been missed during proof-reading.     "

## 2025-02-05 ENCOUNTER — HOSPITAL ENCOUNTER (OUTPATIENT)
Dept: RADIOLOGY | Facility: HOSPITAL | Age: 75
Discharge: HOME | End: 2025-02-05
Attending: UROLOGY
Payer: COMMERCIAL

## 2025-02-05 DIAGNOSIS — R10.84 GENERALIZED ABDOMINAL PAIN: ICD-10-CM

## 2025-02-05 DIAGNOSIS — N13.39 OTHER HYDRONEPHROSIS: ICD-10-CM

## 2025-02-05 PROCEDURE — 74176 CT ABD & PELVIS W/O CONTRAST: CPT | Mod: TC

## 2025-02-05 PROCEDURE — A9562 TC99M MERTIATIDE: HCPCS | Performed by: UROLOGY

## 2025-02-05 PROCEDURE — 78708 K FLOW/FUNCT IMAGE W/DRUG: CPT | Mod: 26,,, | Performed by: NUCLEAR MEDICINE

## 2025-02-05 PROCEDURE — 74176 CT ABD & PELVIS W/O CONTRAST: CPT | Mod: 26,,, | Performed by: RADIOLOGY

## 2025-02-05 PROCEDURE — 78708 K FLOW/FUNCT IMAGE W/DRUG: CPT | Mod: TC

## 2025-02-05 RX ORDER — BETIATIDE 1 MG/1
7 INJECTION, POWDER, LYOPHILIZED, FOR SOLUTION INTRAVENOUS
Status: COMPLETED | OUTPATIENT
Start: 2025-02-05 | End: 2025-02-05

## 2025-02-05 RX ADMIN — TECHNESCAN TC 99M MERTIATIDE 7 MILLICURIE: 1 INJECTION, POWDER, LYOPHILIZED, FOR SOLUTION INTRAVENOUS at 12:02

## 2025-02-07 ENCOUNTER — OFFICE VISIT (OUTPATIENT)
Dept: UROLOGY | Facility: CLINIC | Age: 75
End: 2025-02-07
Payer: COMMERCIAL

## 2025-02-07 ENCOUNTER — LAB VISIT (OUTPATIENT)
Dept: LAB | Facility: HOSPITAL | Age: 75
End: 2025-02-07
Payer: COMMERCIAL

## 2025-02-07 VITALS
HEIGHT: 65 IN | SYSTOLIC BLOOD PRESSURE: 148 MMHG | DIASTOLIC BLOOD PRESSURE: 83 MMHG | HEART RATE: 76 BPM | BODY MASS INDEX: 25.34 KG/M2 | WEIGHT: 152.13 LBS

## 2025-02-07 DIAGNOSIS — N20.1 RIGHT URETERAL STONE: Primary | ICD-10-CM

## 2025-02-07 DIAGNOSIS — R91.1 PULMONARY NODULE: ICD-10-CM

## 2025-02-07 DIAGNOSIS — N13.2 HYDRONEPHROSIS WITH URINARY OBSTRUCTION DUE TO URETERAL CALCULUS: ICD-10-CM

## 2025-02-07 DIAGNOSIS — N20.1 RIGHT URETERAL STONE: ICD-10-CM

## 2025-02-07 DIAGNOSIS — Z76.89 ENCOUNTER TO ESTABLISH CARE: ICD-10-CM

## 2025-02-07 DIAGNOSIS — R10.9 RIGHT FLANK PAIN: ICD-10-CM

## 2025-02-07 LAB
BILIRUB SERPL-MCNC: ABNORMAL MG/DL
BLOOD URINE, POC: ABNORMAL
CLARITY, POC UA: ABNORMAL
COLOR, POC UA: YELLOW
GLUCOSE UR QL STRIP: ABNORMAL
KETONES UR QL STRIP: ABNORMAL
LEUKOCYTE ESTERASE URINE, POC: ABNORMAL
NITRITE, POC UA: ABNORMAL
PH, POC UA: 8
PROTEIN, POC: ABNORMAL
SPECIFIC GRAVITY, POC UA: 1.01
UROBILINOGEN, POC UA: ABNORMAL

## 2025-02-07 PROCEDURE — 99999 PR PBB SHADOW E&M-EST. PATIENT-LVL III: CPT | Mod: PBBFAC,,,

## 2025-02-07 PROCEDURE — 81002 URINALYSIS NONAUTO W/O SCOPE: CPT | Mod: S$GLB,,,

## 2025-02-07 PROCEDURE — 3077F SYST BP >= 140 MM HG: CPT | Mod: CPTII,S$GLB,,

## 2025-02-07 PROCEDURE — 87088 URINE BACTERIA CULTURE: CPT

## 2025-02-07 PROCEDURE — 3079F DIAST BP 80-89 MM HG: CPT | Mod: CPTII,S$GLB,,

## 2025-02-07 PROCEDURE — 99214 OFFICE O/P EST MOD 30 MIN: CPT | Mod: S$GLB,,,

## 2025-02-07 PROCEDURE — 1159F MED LIST DOCD IN RCRD: CPT | Mod: CPTII,S$GLB,,

## 2025-02-07 PROCEDURE — 87086 URINE CULTURE/COLONY COUNT: CPT

## 2025-02-07 PROCEDURE — 1160F RVW MEDS BY RX/DR IN RCRD: CPT | Mod: CPTII,S$GLB,,

## 2025-02-07 PROCEDURE — 3008F BODY MASS INDEX DOCD: CPT | Mod: CPTII,S$GLB,,

## 2025-02-07 PROCEDURE — 1125F AMNT PAIN NOTED PAIN PRSNT: CPT | Mod: CPTII,S$GLB,,

## 2025-02-07 RX ORDER — TRAMADOL HYDROCHLORIDE 50 MG/1
50 TABLET ORAL EVERY 6 HOURS PRN
Qty: 12 TABLET | Refills: 0 | Status: SHIPPED | OUTPATIENT
Start: 2025-02-07 | End: 2025-02-10

## 2025-02-07 RX ORDER — ONDANSETRON 4 MG/1
4 TABLET, ORALLY DISINTEGRATING ORAL EVERY 6 HOURS PRN
Qty: 60 TABLET | Refills: 0 | Status: SHIPPED | OUTPATIENT
Start: 2025-02-07 | End: 2025-02-27

## 2025-02-07 RX ORDER — TAMSULOSIN HYDROCHLORIDE 0.4 MG/1
0.4 CAPSULE ORAL DAILY
Qty: 30 CAPSULE | Refills: 1 | Status: SHIPPED | OUTPATIENT
Start: 2025-02-07 | End: 2025-04-08

## 2025-02-07 RX ORDER — CEFAZOLIN SODIUM 2 G/50ML
2 SOLUTION INTRAVENOUS
Status: CANCELLED | OUTPATIENT
Start: 2025-02-07

## 2025-02-07 NOTE — H&P (VIEW-ONLY)
"Subjective:       Patient ID: Alize Vernon is a 74 y.o. female.    Chief Complaint: right ureteral stone   This is a 74 y.o.  female patient that is new to me but not new to the system.  This is a patient seen by Dr. Valdez in the past with history of kidney stones. Last seen in clinic on 12/30/25 with right ureteral stone, Dr. Valdez offered surgery at the time but patient deferred.   Patient was scheduled for repeat CT scan per Dr. Valdez but she kept canceling and rescheduling.  CT scan 2/5/25-- Severe hydronephrosis with cortical thinning of the inferior pole of the right kidney similar to prior exam with multiple stones in the right distal ureter measuring up to 5 mm. 0.8 cm indeterminate lesion in the inferior pole of the left kidney demonstrating attenuation higher than simple cyst.   4 mm pulmonary nodule in the right middle lobe.   NM renogram 2/5/25-- The right kidney only upper pole is functioning with photopenic lower pole and filling of activity likely cystic than hydronephrosis and upper pole ureter is not obstructed post lasix emptying.   The differential renal function is 69.8 % on the left and 30.15 % on the right.    Today patient reports right sided flank pain, 5/10 on numerical pain scale. Reports nausea intermittently. Denies vomiting, fevers, chills, gross hematuria, decrease in urination.    Denies ever smoking. Does not have a primary care doctor.        No results found for: "CREATININE"    ---  PMH/PSH/Medications/Allergies/Social history reviewed and as in chart.    Review of Systems   Constitutional:  Negative for chills and fever.   Respiratory:  Negative for shortness of breath.    Cardiovascular:  Negative for chest pain and palpitations.   Gastrointestinal:  Positive for nausea. Negative for abdominal pain, constipation, diarrhea and vomiting.   Genitourinary:  Positive for flank pain. Negative for difficulty urinating, dysuria, frequency, hematuria, pelvic pain, urgency and vaginal " pain.   Neurological:  Negative for dizziness and weakness.   Psychiatric/Behavioral:  Negative for agitation, confusion and sleep disturbance.      Objective:      Physical Exam  HENT:      Head: Normocephalic.   Pulmonary:      Effort: Pulmonary effort is normal.   Musculoskeletal:         General: Normal range of motion.      Cervical back: Normal range of motion.   Skin:     General: Skin is warm and dry.   Neurological:      Mental Status: She is alert and oriented to person, place, and time.       Assessment:     Problem Noted   Right Ureteral Stone 2/7/2025   Hydronephrosis With Urinary Obstruction Due to Ureteral Calculus 2/7/2025   Right Flank Pain 2/7/2025       Plan:     Discussed the need for surgery to remove ureteral stone. Discussed risks of not getting surgery including extensive kidney damage, loss of kidney function. Patient agreed to schedule surgery with Dr. Valdez  Surgery will be scheduled 2/21/25  Urine sent for urine culture, will send antibiotics if needed.  4. If intractable pain, nausea and vomiting limiting PO intake, fever, decrease in urination needs to go to ED.     5. Rx sent for flomax 0.4mg daily      Rx sent for zofran 4mg [id number] PRN nausea      Rx sent for tramadol PRN pain.  6. Referral placed for PCP to establish care and further evaluate pulmonary nodule, possible surveillance with repeat     CT scans.   7. Follow-up 2/21/25 for surgery or sooner if needed    ARLEY James    I spent a total of 30 minutes on the day of the visit.This includes face to face time and non-face to face time preparing to see the patient (eg, review of tests), obtaining and/or reviewing separately obtained history, documenting clinical information in the electronic or other health record, independently interpreting results and communicating results to the patient/family/caregiver, or care coordinator.           Dr Fareed Lou

## 2025-02-07 NOTE — PROGRESS NOTES
"Subjective:       Patient ID: Alize Vernon is a 74 y.o. female.    Chief Complaint: right ureteral stone   This is a 74 y.o.  female patient that is new to me but not new to the system.  This is a patient seen by Dr. Valdez in the past with history of kidney stones. Last seen in clinic on 12/30/25 with right ureteral stone, Dr. Valdez offered surgery at the time but patient deferred.   Patient was scheduled for repeat CT scan per Dr. Valdez but she kept canceling and rescheduling.  CT scan 2/5/25-- Severe hydronephrosis with cortical thinning of the inferior pole of the right kidney similar to prior exam with multiple stones in the right distal ureter measuring up to 5 mm. 0.8 cm indeterminate lesion in the inferior pole of the left kidney demonstrating attenuation higher than simple cyst.   4 mm pulmonary nodule in the right middle lobe.   NM renogram 2/5/25-- The right kidney only upper pole is functioning with photopenic lower pole and filling of activity likely cystic than hydronephrosis and upper pole ureter is not obstructed post lasix emptying.   The differential renal function is 69.8 % on the left and 30.15 % on the right.    Today patient reports right sided flank pain, 5/10 on numerical pain scale. Reports nausea intermittently. Denies vomiting, fevers, chills, gross hematuria, decrease in urination.    Denies ever smoking. Does not have a primary care doctor.        No results found for: "CREATININE"    ---  PMH/PSH/Medications/Allergies/Social history reviewed and as in chart.    Review of Systems   Constitutional:  Negative for chills and fever.   Respiratory:  Negative for shortness of breath.    Cardiovascular:  Negative for chest pain and palpitations.   Gastrointestinal:  Positive for nausea. Negative for abdominal pain, constipation, diarrhea and vomiting.   Genitourinary:  Positive for flank pain. Negative for difficulty urinating, dysuria, frequency, hematuria, pelvic pain, urgency and vaginal " pain.   Neurological:  Negative for dizziness and weakness.   Psychiatric/Behavioral:  Negative for agitation, confusion and sleep disturbance.      Objective:      Physical Exam  HENT:      Head: Normocephalic.   Pulmonary:      Effort: Pulmonary effort is normal.   Musculoskeletal:         General: Normal range of motion.      Cervical back: Normal range of motion.   Skin:     General: Skin is warm and dry.   Neurological:      Mental Status: She is alert and oriented to person, place, and time.       Assessment:     Problem Noted   Right Ureteral Stone 2/7/2025   Hydronephrosis With Urinary Obstruction Due to Ureteral Calculus 2/7/2025   Right Flank Pain 2/7/2025       Plan:     Discussed the need for surgery to remove ureteral stone. Discussed risks of not getting surgery including extensive kidney damage, loss of kidney function. Patient agreed to schedule surgery with Dr. Valdez  Surgery will be scheduled 2/21/25  Urine sent for urine culture, will send antibiotics if needed.  4. If intractable pain, nausea and vomiting limiting PO intake, fever, decrease in urination needs to go to ED.     5. Rx sent for flomax 0.4mg daily      Rx sent for zofran 4mg [id number] PRN nausea      Rx sent for tramadol PRN pain.  6. Referral placed for PCP to establish care and further evaluate pulmonary nodule, possible surveillance with repeat     CT scans.   7. Follow-up 2/21/25 for surgery or sooner if needed    ARLEY James    I spent a total of 30 minutes on the day of the visit.This includes face to face time and non-face to face time preparing to see the patient (eg, review of tests), obtaining and/or reviewing separately obtained history, documenting clinical information in the electronic or other health record, independently interpreting results and communicating results to the patient/family/caregiver, or care coordinator.

## 2025-02-08 LAB — BACTERIA UR CULT: ABNORMAL

## 2025-02-17 ENCOUNTER — HOSPITAL ENCOUNTER (OUTPATIENT)
Dept: PREADMISSION TESTING | Facility: HOSPITAL | Age: 75
Discharge: HOME | End: 2025-02-17
Attending: NURSE PRACTITIONER
Payer: COMMERCIAL

## 2025-02-17 ENCOUNTER — PATIENT MESSAGE (OUTPATIENT)
Dept: PREADMISSION TESTING | Facility: HOSPITAL | Age: 75
End: 2025-02-17

## 2025-02-17 ENCOUNTER — ANESTHESIA EVENT (OUTPATIENT)
Dept: SURGERY | Facility: HOSPITAL | Age: 75
End: 2025-02-17
Payer: COMMERCIAL

## 2025-02-17 NOTE — PRE-PROCEDURE INSTRUCTIONS
[facility].    Allergies, medical, surgical, family and psychosocial histories reviewed with patient. Periop plan of care reviewed. Preop instructions given, including medications to take and to hold. Hibiclens soap and instructions on use given. Time allotted for questions to be addressed.      Arrival time 0530       Please take Flomax the morning of surgery.     Surgery instructions reviewed and surgery booklet sent or emailed to the patient via the portal.

## 2025-02-17 NOTE — ANESTHESIA PREPROCEDURE EVALUATION
02/17/2025  Alize Vernon is a 74 y.o., female scheduled for CYSTOURETEROSCOPY, WITH HOLMIUM LASER LITHOTRIPSY OF URETERAL CALCULUS AND STENT INSERTION (Right) 2/21/25.    Past Medical History:   Diagnosis Date    Allergy     Kidney stone      Past Surgical History:   Procedure Laterality Date    LITHOTRIPSY       Review of patient's allergies indicates:   Allergen Reactions    Codeine Rash     Current Outpatient Medications   Medication Instructions    ondansetron (ZOFRAN-ODT) 4 mg, Oral, Every 6 hours PRN    tamsulosin (FLOMAX) 0.4 mg, Oral, Daily       Pre-op Assessment    I have reviewed the Patient Summary Reports.     I have reviewed the Nursing Notes.    I have reviewed the Medications.     Review of Systems  Anesthesia Hx:  No problems with previous Anesthesia             Denies Family Hx of Anesthesia complications.    Denies Personal Hx of Anesthesia complications.                    Social:  Non-Smoker, Social Alcohol Use       Cardiovascular:  Cardiovascular Normal Exercise tolerance: good          Denies Angina.                                    Pulmonary:  Pulmonary Normal      Denies Shortness of breath.                  Renal/:   renal calculi               Hepatic/GI:  Hepatic/GI Normal                    Musculoskeletal:  Musculoskeletal Normal                Neurological:  Neurology Normal Denies TIA.  Denies CVA.    Denies Seizures.          Denies Peripheral Neuropathy                          Endocrine:  Endocrine Normal            Psych:  Psychiatric Normal                  Physical Exam  General: Cooperative and Oriented    Airway:  Mallampati: II   Mouth Opening: Normal  TM Distance: Normal  Tongue: Normal  Neck ROM: Normal ROM    Dental:  Dentures  Upper denture  Chest/Lungs:  Clear to auscultation, Normal Respiratory Rate    Heart:  Rate: Normal  Rhythm: Regular Rhythm  Sounds:  "Normal    No results found for: "WBC", "HGB", "HCT", "PLT", "CHOL", "TRIG", "HDL", "LDLDIRECT", "ALT", "AST", "NA", "K", "CL", "CREATININE", "BUN", "CO2", "TSH", "PSA", "INR", "GLUF", "HGBA1C", "MICROALBUR"    Results for orders placed or performed during the hospital encounter of 12/19/24   EKG 12-lead    Collection Time: 12/19/24  7:44 PM   Result Value Ref Range    QRS Duration 68 ms    [facility] QTC Calculation 414 ms    Narrative    Test Reason : R10.10,    Vent. Rate :  78 BPM     Atrial Rate :  78 BPM     P-R Int : 206 ms          QRS Dur :  68 ms      QT Int : 364 ms       P-R-T Axes :  58  -5  21 degrees    QTcB Int : 414 ms    Normal sinus rhythm  Normal ECG  No previous ECGs available  Confirmed by Jimbo Downs ([id number]) on 12/21/2024 12:16:42 PM    Referred By: AAAREFERRAL SELF           Confirmed By: Jimbo Downs         Anesthesia Plan  Type of Anesthesia, risks & benefits discussed:    Anesthesia Type: Gen ETT  Intra-op Monitoring Plan: Standard ASA Monitors  Post Op Pain Control Plan: multimodal analgesia  Induction:  IV  Airway Plan: Video, Post-Induction  Informed Consent: Informed consent signed with the Patient and all parties understand the risks and agree with anesthesia plan.  All questions answered.   ASA Score: 2  Day of Surgery Review of History & Physical: H&P Update referred to the surgeon/provider.  Anesthesia Plan Notes: Anesthesia consent to be signed prior to surgery 2/21/25      Ready For Surgery From Anesthesia Perspective.     .      "

## 2025-02-17 NOTE — DISCHARGE INSTRUCTIONS

## 2025-02-21 ENCOUNTER — HOSPITAL ENCOUNTER (OUTPATIENT)
Facility: HOSPITAL | Age: 75
Discharge: HOME | End: 2025-02-21
Attending: UROLOGY | Admitting: UROLOGY
Payer: COMMERCIAL

## 2025-02-21 ENCOUNTER — ANESTHESIA (OUTPATIENT)
Dept: SURGERY | Facility: HOSPITAL | Age: 75
End: 2025-02-21
Payer: COMMERCIAL

## 2025-02-21 VITALS
RESPIRATION RATE: 17 BRPM | OXYGEN SATURATION: 97 % | HEART RATE: 61 BPM | WEIGHT: 145 LBS | TEMPERATURE: 98 F | SYSTOLIC BLOOD PRESSURE: 132 MMHG | DIASTOLIC BLOOD PRESSURE: 71 MMHG | HEIGHT: 63 IN | BODY MASS INDEX: 25.69 KG/M2

## 2025-02-21 DIAGNOSIS — R10.9 RIGHT FLANK PAIN: ICD-10-CM

## 2025-02-21 DIAGNOSIS — N20.1 RIGHT URETERAL STONE: Primary | ICD-10-CM

## 2025-02-21 PROCEDURE — 36000707: Performed by: UROLOGY

## 2025-02-21 PROCEDURE — 25000003 PHARM REV CODE 250

## 2025-02-21 PROCEDURE — C1758 CATHETER, URETERAL: HCPCS | Performed by: UROLOGY

## 2025-02-21 PROCEDURE — 25500020 PHARM REV CODE 255: Performed by: UROLOGY

## 2025-02-21 PROCEDURE — 71000033 HC RECOVERY, INTIAL HOUR: Performed by: UROLOGY

## 2025-02-21 PROCEDURE — 71000015 HC POSTOP RECOV 1ST HR: Performed by: UROLOGY

## 2025-02-21 PROCEDURE — 52332 CYSTOSCOPY AND TREATMENT: CPT | Mod: 51,RT,, | Performed by: UROLOGY

## 2025-02-21 PROCEDURE — 63600175 PHARM REV CODE 636 W HCPCS

## 2025-02-21 PROCEDURE — 52351 CYSTOURETERO & OR PYELOSCOPE: CPT | Mod: ,,, | Performed by: UROLOGY

## 2025-02-21 PROCEDURE — 37000008 HC ANESTHESIA 1ST 15 MINUTES: Performed by: UROLOGY

## 2025-02-21 PROCEDURE — 27201423 OPTIME MED/SURG SUP & DEVICES STERILE SUPPLY: Performed by: UROLOGY

## 2025-02-21 PROCEDURE — 36000706: Performed by: UROLOGY

## 2025-02-21 PROCEDURE — 63600175 PHARM REV CODE 636 W HCPCS: Mod: JZ,TB

## 2025-02-21 PROCEDURE — 25000003 PHARM REV CODE 250: Performed by: UROLOGY

## 2025-02-21 PROCEDURE — C2617 STENT, NON-COR, TEM W/O DEL: HCPCS | Performed by: UROLOGY

## 2025-02-21 PROCEDURE — 37000009 HC ANESTHESIA EA ADD 15 MINS: Performed by: UROLOGY

## 2025-02-21 PROCEDURE — 63600175 PHARM REV CODE 636 W HCPCS: Performed by: STUDENT IN AN ORGANIZED HEALTH CARE EDUCATION/TRAINING PROGRAM

## 2025-02-21 PROCEDURE — 71000016 HC POSTOP RECOV ADDL HR: Performed by: UROLOGY

## 2025-02-21 PROCEDURE — C1769 GUIDE WIRE: HCPCS | Performed by: UROLOGY

## 2025-02-21 PROCEDURE — 74420 UROGRAPHY RTRGR +-KUB: CPT | Mod: 26,,, | Performed by: UROLOGY

## 2025-02-21 DEVICE — URETERAL STENT
Type: IMPLANTABLE DEVICE | Site: URETER | Status: FUNCTIONAL
Brand: POLARIS™ ULTRA

## 2025-02-21 RX ORDER — FENTANYL CITRATE 50 UG/ML
INJECTION, SOLUTION INTRAMUSCULAR; INTRAVENOUS
Status: DISCONTINUED | OUTPATIENT
Start: 2025-02-21 | End: 2025-02-21

## 2025-02-21 RX ORDER — CEFAZOLIN 2 G/1
2 INJECTION, POWDER, FOR SOLUTION INTRAMUSCULAR; INTRAVENOUS
Status: DISCONTINUED | OUTPATIENT
Start: 2025-02-21 | End: 2025-02-21 | Stop reason: HOSPADM

## 2025-02-21 RX ORDER — PHENAZOPYRIDINE HYDROCHLORIDE 200 MG/1
200 TABLET, FILM COATED ORAL 3 TIMES DAILY PRN
Qty: 21 TABLET | Refills: 0 | Status: SHIPPED | OUTPATIENT
Start: 2025-02-21 | End: 2025-02-28

## 2025-02-21 RX ORDER — SODIUM CHLORIDE 0.9 % (FLUSH) 0.9 %
10 SYRINGE (ML) INJECTION
Status: DISCONTINUED | OUTPATIENT
Start: 2025-02-21 | End: 2025-02-21 | Stop reason: HOSPADM

## 2025-02-21 RX ORDER — GLUCAGON 1 MG
1 KIT INJECTION
Status: DISCONTINUED | OUTPATIENT
Start: 2025-02-21 | End: 2025-02-21 | Stop reason: HOSPADM

## 2025-02-21 RX ORDER — LIDOCAINE HYDROCHLORIDE 10 MG/ML
1 INJECTION, SOLUTION EPIDURAL; INFILTRATION; INTRACAUDAL; PERINEURAL ONCE
Status: DISCONTINUED | OUTPATIENT
Start: 2025-02-21 | End: 2025-02-21 | Stop reason: HOSPADM

## 2025-02-21 RX ORDER — PROPOFOL 10 MG/ML
VIAL (ML) INTRAVENOUS
Status: DISCONTINUED | OUTPATIENT
Start: 2025-02-21 | End: 2025-02-21

## 2025-02-21 RX ORDER — ROCURONIUM BROMIDE 10 MG/ML
INJECTION, SOLUTION INTRAVENOUS
Status: DISCONTINUED | OUTPATIENT
Start: 2025-02-21 | End: 2025-02-21

## 2025-02-21 RX ORDER — OXYBUTYNIN CHLORIDE 5 MG/1
5 TABLET ORAL ONCE
Status: COMPLETED | OUTPATIENT
Start: 2025-02-21 | End: 2025-02-21

## 2025-02-21 RX ORDER — LIDOCAINE HYDROCHLORIDE 20 MG/ML
INJECTION, SOLUTION EPIDURAL; INFILTRATION; INTRACAUDAL; PERINEURAL
Status: DISCONTINUED | OUTPATIENT
Start: 2025-02-21 | End: 2025-02-21

## 2025-02-21 RX ORDER — PHENAZOPYRIDINE HYDROCHLORIDE 100 MG/1
200 TABLET, FILM COATED ORAL ONCE
Status: COMPLETED | OUTPATIENT
Start: 2025-02-21 | End: 2025-02-21

## 2025-02-21 RX ORDER — KETOROLAC TROMETHAMINE 10 MG/1
10 TABLET, FILM COATED ORAL EVERY 6 HOURS
Qty: 12 TABLET | Refills: 0 | Status: SHIPPED | OUTPATIENT
Start: 2025-02-21 | End: 2025-02-24

## 2025-02-21 RX ORDER — CEFAZOLIN SODIUM 1 G/3ML
INJECTION, POWDER, FOR SOLUTION INTRAMUSCULAR; INTRAVENOUS
Status: DISCONTINUED | OUTPATIENT
Start: 2025-02-21 | End: 2025-02-21

## 2025-02-21 RX ORDER — ONDANSETRON HYDROCHLORIDE 2 MG/ML
4 INJECTION, SOLUTION INTRAVENOUS ONCE
Status: COMPLETED | OUTPATIENT
Start: 2025-02-21 | End: 2025-02-21

## 2025-02-21 RX ORDER — OXYCODONE HYDROCHLORIDE 5 MG/1
5 TABLET ORAL
Status: DISCONTINUED | OUTPATIENT
Start: 2025-02-21 | End: 2025-02-21 | Stop reason: HOSPADM

## 2025-02-21 RX ORDER — HALOPERIDOL 5 MG/ML
0.5 INJECTION INTRAMUSCULAR EVERY 10 MIN PRN
Status: DISCONTINUED | OUTPATIENT
Start: 2025-02-21 | End: 2025-02-21 | Stop reason: HOSPADM

## 2025-02-21 RX ORDER — ONDANSETRON HYDROCHLORIDE 2 MG/ML
INJECTION, SOLUTION INTRAVENOUS
Status: DISCONTINUED | OUTPATIENT
Start: 2025-02-21 | End: 2025-02-21

## 2025-02-21 RX ORDER — PHENYLEPHRINE HYDROCHLORIDE 10 MG/ML
INJECTION INTRAVENOUS
Status: DISCONTINUED | OUTPATIENT
Start: 2025-02-21 | End: 2025-02-21

## 2025-02-21 RX ORDER — DEXAMETHASONE SODIUM PHOSPHATE 4 MG/ML
INJECTION, SOLUTION INTRA-ARTICULAR; INTRALESIONAL; INTRAMUSCULAR; INTRAVENOUS; SOFT TISSUE
Status: DISCONTINUED | OUTPATIENT
Start: 2025-02-21 | End: 2025-02-21

## 2025-02-21 RX ORDER — OXYBUTYNIN CHLORIDE 5 MG/1
5 TABLET ORAL 3 TIMES DAILY PRN
Qty: 9 TABLET | Refills: 0 | Status: SHIPPED | OUTPATIENT
Start: 2025-02-21 | End: 2025-02-24

## 2025-02-21 RX ORDER — TAMSULOSIN HYDROCHLORIDE 0.4 MG/1
0.4 CAPSULE ORAL NIGHTLY
Qty: 7 CAPSULE | Refills: 0 | Status: SHIPPED | OUTPATIENT
Start: 2025-02-21 | End: 2025-02-28

## 2025-02-21 RX ORDER — ONDANSETRON 4 MG/1
4 TABLET, ORALLY DISINTEGRATING ORAL ONCE
Status: COMPLETED | OUTPATIENT
Start: 2025-02-21 | End: 2025-02-21

## 2025-02-21 RX ORDER — HYDROMORPHONE HYDROCHLORIDE 2 MG/ML
0.2 INJECTION, SOLUTION INTRAMUSCULAR; INTRAVENOUS; SUBCUTANEOUS EVERY 5 MIN PRN
Status: DISCONTINUED | OUTPATIENT
Start: 2025-02-21 | End: 2025-02-21 | Stop reason: HOSPADM

## 2025-02-21 RX ADMIN — SUGAMMADEX 200 MG: 100 INJECTION, SOLUTION INTRAVENOUS at 07:02

## 2025-02-21 RX ADMIN — HYDROMORPHONE HYDROCHLORIDE 0.2 MG: 2 INJECTION, SOLUTION INTRAMUSCULAR; INTRAVENOUS; SUBCUTANEOUS at 08:02

## 2025-02-21 RX ADMIN — ONDANSETRON 4 MG: 2 INJECTION, SOLUTION INTRAMUSCULAR; INTRAVENOUS at 07:02

## 2025-02-21 RX ADMIN — ONDANSETRON 4 MG: 4 TABLET, ORALLY DISINTEGRATING ORAL at 11:02

## 2025-02-21 RX ADMIN — LIDOCAINE HYDROCHLORIDE 60 MG: 20 INJECTION, SOLUTION EPIDURAL; INFILTRATION; INTRACAUDAL; PERINEURAL at 07:02

## 2025-02-21 RX ADMIN — ROCURONIUM BROMIDE 50 MG: 10 INJECTION, SOLUTION INTRAVENOUS at 07:02

## 2025-02-21 RX ADMIN — FENTANYL CITRATE 100 MCG: 50 INJECTION INTRAMUSCULAR; INTRAVENOUS at 07:02

## 2025-02-21 RX ADMIN — DEXAMETHASONE SODIUM PHOSPHATE 4 MG: 4 INJECTION, SOLUTION INTRA-ARTICULAR; INTRALESIONAL; INTRAMUSCULAR; INTRAVENOUS; SOFT TISSUE at 07:02

## 2025-02-21 RX ADMIN — PHENYLEPHRINE HYDROCHLORIDE 100 MCG: 10 INJECTION INTRAVENOUS at 07:02

## 2025-02-21 RX ADMIN — PROPOFOL 150 MG: 10 INJECTION, EMULSION INTRAVENOUS at 07:02

## 2025-02-21 RX ADMIN — ONDANSETRON 4 MG: 2 INJECTION INTRAMUSCULAR; INTRAVENOUS at 09:02

## 2025-02-21 RX ADMIN — SODIUM CHLORIDE: 0.9 INJECTION, SOLUTION INTRAVENOUS at 07:02

## 2025-02-21 RX ADMIN — CEFAZOLIN 2 G: 330 INJECTION, POWDER, FOR SOLUTION INTRAMUSCULAR; INTRAVENOUS at 07:02

## 2025-02-21 RX ADMIN — PHENAZOPYRIDINE HYDROCHLORIDE 200 MG: 100 TABLET ORAL at 08:02

## 2025-02-21 RX ADMIN — OXYBUTYNIN CHLORIDE 5 MG: 5 TABLET ORAL at 08:02

## 2025-02-21 NOTE — TRANSFER OF CARE
"Anesthesia Transfer of Care Note    Patient: Alize Vernon    Procedure(s) Performed: Procedure(s) (LRB):  CYSTOURETEROSCOPY, WITH HOLMIUM LASER LITHOTRIPSY OF URETERAL CALCULUS AND STENT INSERTION (Right)    Patient location: PACU    Anesthesia Type: general    Transport from OR: Transported from OR on 6-10 L/min O2 by face mask with adequate spontaneous ventilation    Post pain: adequate analgesia    Post assessment: no apparent anesthetic complications    Post vital signs: stable    Level of consciousness: awake    Nausea/Vomiting: no nausea/vomiting    Complications: none    Transfer of care protocol was followed      Last vitals: Visit Vitals  BP (!) 141/70 (BP Location: Left arm, Patient Position: Lying)   Pulse 74   Temp 36.7 °C (98.1 °F) (Skin)   Resp 18   Ht 5' 3" (1.6 m)   Wt 65.8 kg (145 lb)   SpO2 99%   Breastfeeding No   BMI 25.69 kg/m²     "

## 2025-02-21 NOTE — OP NOTE
[place] - Surgery ([facility])  [facility]  Operative Note    Date: 02/21/2025    Pre-Op Diagnosis: Right distal ureteral stone    Patient Active Problem List    Diagnosis Date Noted    Right ureteral stone 02/07/2025    Hydronephrosis with urinary obstruction due to ureteral calculus 02/07/2025    Right flank pain 02/07/2025    Hyperlipidemia 12/19/2024    History of CT scan 12/17/2018    Chest pain 11/01/2018       Post-Op Diagnosis:   Right ureteral stone  Duplicated urinary system bilaterally    Procedure(s) Performed:   1. Right ureteroscopy   2. Right retrograde pyelogram  3. Cystoscopy  4. Right ureteral stent placement    Specimen(s):  None    Staff Surgeon: Paco Valdez MD      Assistant Surgeon: Baldo Rico MD     Circulator: Marilia Humphreys RN  Relief Circulator: Smooht Goel RN  Scrub Person: Juan Mccormack     Anesthesia: General endotracheal anesthesia    Indications: Alize Vernon is a 74 y.o. female with a right distal ureteral stone presenting for definitive stone management. She is not pre-stented.    Findings:  1. No stone seen on right semirigid or flexible ureteroscopy to level of proximal ureter  2. Duplicated urinary system bilaterally with 2 ureteral orifices on each side (Total of 4)  3. 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 Unable to advance wire, 5 Fr ureteral catheter or semirigid ureteroscope beyond blind ending region.    Estimated Blood Loss: Minimal    Drains:   1. Right 6 Fr x 24 cm JJ ureteral stent with strings    Procedure in detail:  After risks, benefits, and possible complications were explained, the patient elected to undergo the procedure and informed consent was obtained. All questions were answered in the seema-operative area. The patient was transferred to the cystoscopy suite and placed in the supine position. SCDs were applied and working. Anesthesia was administered. The patient was then placed in the  dorsal lithotomy position and prepped and draped in the usual sterile fashion. Time out was performed, and seema-procedural antibiotics were confirmed.     The stone was not seen on  film. A rigid cystoscope in a 22 Fr sheath was introduced into the patient's urethra. This passed easily. The entire urethra was visualized which showed no strictures or masses. Cystoscopy revealed the ureteral orifices in the normal anatomic location bilaterally. There were no bladder tumors, no bladder stones and no diverticula seen.    We attempted to inspect the right lateral ureteral orifice. We were unable to advance the wire or 5 fr ureteral catheter beyond approximately 2-3 cm. We then inserted the semirigid ureteroscope which confirmed a blind ending ureter. Fluoro was used to document  this location.  We instilled contrast to perform a retrograde pyelography.  10 cc of Omnipaque contrast was instilled in the ureter was confirmed to be blind-ending.    A motion wire was passed up the right medial ureteral orifice and up into the kidney. This passed easily and placement was confirmed fluoroscopically. The cystoscope was removed, keeping the wire in place.    An 8 Fr semirigid ureteroscope was passed into the patient's bladder alongside the wire under direct vision. It was then passed through the  medial right ureteral orifice alongside the wire. The ureteroscope was advanced using a second sensor wire in a train track fashion and the entire length of the ureter was surveyed and no additional stone fragments were visualized. The semirigid ureteroscope was then removed from the patient, keeping both of the wires in place.    An 8 Fr flexible ureteroscope was advanced over the sensor wire to the level of the proximal ureter under continuous fluoroscopy. This passed easily. The second sensor wire was removed. This was removed from the patient and the entire right ureter inspected and there were no stones seen.     A 6 Fr x 24 cm  JJ ureteral stent with strings was passed over the wire and up into the renal pelvis using fluoro. When the coil appeared to be in good position in the kidney and the radio-opaque marker of the pusher was at the inferior pubis, the wire was removed under continuous fluoro. Good coils were seen in the kidney and the bladder using fluoro.  We again instilled Omnipaque contrast and noted no filling defects or any other abnormalities completing a 2nd retrograde pyelogram.    The bladder was then drained with the cystoscope. The patient was awoken from anesthesia and removed from lithotomy. They were transferred to the recovery room in stable condition.    Disposition:  The patient will be discharged home from PACU. She follow up with Dr. Valdez  in 1 month with a renal ultrasound prior. She was given written instructions to self-remove her ureteral stent with strings on Monday, 2/24/2025.    Baldo Rico MD     Attending Attestation:   I was present for, scrubbed in, and participated in the entire procedure.     Paco Valdez MD  Urology  [facility]

## 2025-02-21 NOTE — ANESTHESIA PROCEDURE NOTES
Intubation    Date/Time: 2/21/2025 7:13 AM    Performed by: Chance Buckner MD  Authorized by: Jc Meier DO    Intubation:     Induction:  Intravenous    Intubated:  Postinduction    Mask Ventilation:  Easy with oral airway    Attempts:  1    Attempted By:  Resident anesthesiologist    Method of Intubation:  Video laryngoscopy    Blade:  Driscoll 3    Laryngeal View Grade: Grade I - full view of cords      Difficult Airway Encountered?: No      Complications:  None    Airway Device:  Oral endotracheal tube    Airway Device Size:  7.0    Style/Cuff Inflation:  Cuffed (inflated to minimal occlusive pressure)    Tube secured:  21    Secured at:  The lips    Placement Verified By:  Capnometry    Complicating Factors:  None    Findings Post-Intubation:  BS equal bilateral and atraumatic/condition of teeth unchanged

## 2025-02-21 NOTE — DISCHARGE SUMMARY
[place] - Surgery (Hospital)  Discharge Note  Short Stay    Procedure(s) (LRB):  CYSTOURETEROSCOPY, WITH RETROGRADE PYELOGRAM AND URETERAL STENT INSERTION (Right)      OUTCOME: Patient tolerated treatment/procedure well without complication and is now ready for discharge.    DISPOSITION: Home or Self Care    FINAL DIAGNOSIS:  Right ureteral stone    FOLLOWUP: In clinic with Dr. Valdez in 4 weeks with an ultrasound prior    DISCHARGE INSTRUCTIONS:    Discharge Procedure Orders   US Kidney   Standing Status: Future Standing Exp. Date: 02/21/26     Order Specific Question Answer Comments   May the Radiologist modify the order per protocol to meet the clinical needs of the patient? Yes    Release to patient Immediate      Notify your health care provider if you experience any of the following:  temperature >100.4     Notify your health care provider if you experience any of the following:  persistent nausea and vomiting or diarrhea     Notify your health care provider if you experience any of the following:  severe uncontrolled pain     Notify your health care provider if you experience any of the following:  redness, tenderness, or signs of infection (pain, swelling, redness, odor or green/yellow discharge around incision site)     Notify your health care provider if you experience any of the following:  worsening rash     Notify your health care provider if you experience any of the following:  persistent dizziness, light-headedness, or visual disturbances        TIME SPENT ON DISCHARGE: 10 minutes

## 2025-02-21 NOTE — ANESTHESIA POSTPROCEDURE EVALUATION
Anesthesia Post Evaluation    Patient: Alize Vernon    Procedure(s) Performed: Procedure(s) (LRB):  CYSTOURETEROSCOPY, WITH RETROGRADE PYELOGRAM AND URETERAL STENT INSERTION (Right)    Final Anesthesia Type: general      Patient location during evaluation: PACU  Patient participation: Yes- Able to Participate  Level of consciousness: awake and alert, oriented and awake  Post-procedure vital signs: reviewed and stable  Pain management: adequate  Airway patency: patent  VERONIQUE mitigation strategies: Multimodal analgesia, Extubation while patient is awake and Verification of full reversal of neuromuscular block  PONV status at discharge: No PONV  Anesthetic complications: no      Cardiovascular status: hemodynamically stable  Respiratory status: spontaneous ventilation and room air  Hydration status: euvolemic  Follow-up not needed.              Vitals Value Taken Time   /80 02/21/25 08:58   Temp 36.6 °C (97.9 °F) 02/21/25 08:45   Pulse 74 02/21/25 09:01   Resp 15 02/21/25 09:00   SpO2 97 % 02/21/25 09:01   Vitals shown include unfiled device data.      Event Time   Out of Recovery 08:58:49         Pain/Echo Score: Pain Rating Prior to Med Admin: 6 (2/21/2025  8:20 AM)  Echo Score: 9 (2/21/2025  8:50 AM)

## 2025-02-21 NOTE — DISCHARGE INSTRUCTIONS
Post Ureteroscopy Instructions  Do not strain to have a bowel movement  No strenuous exercise x 7 days  No driving while you are on narcotic pain medications or if you have a Chen catheter    You can expect:  To pass stone fragments if you had a stone procedure  Have pain when you void from your stent if you have a stent in place  See blood in your urine if you have a stent in place    If you have a catheter, please return to the ER if your catheter stops draining or you are having abdominal pain.    Call the doctor if:  Temperature is greater than 101F  Persistent vomiting and inability to keep food down  Inability to void if you do not have a catheter    You may remove your stent with strings on Monday, 2/24/2025.  You will follow up with Dr. Valdez  in 1 month with a kidney ultrasound prior.    Take Flomax (Tamsulosin) 0.4 mg once per day while your stent is in place.  You may take pyridium 200 mg up to 3 times per day for bladder irritation from the stent.  You may take Ditropan 5 mg up to 3 times per day for bladder spasms with stent.    If you have any questions or concerns during normal business hours, please call the urology clinic of your surgery.  If you are having an emergency after 5 pm or weekends, you may call [phone] and ask them to page the urology resident on call.    ANESTHESIA  -For the first 24 hours after surgery:  Do not drive, use heavy equipment, make important decisions, or drink alcohol  -It is normal to feel sleepy for several hours.  Rest until you are more awake.  -Have someone stay with you, if needed.  They can watch for problems and help keep you safe.  -Some possible post anesthesia side effects include: nausea and vomiting, sore throat and hoarseness, sleepiness, and dizziness.    PAIN  -If you have pain after surgery, pain medicine will help you feel better.  Take it as directed, before pain becomes severe.  Most pain relievers taken by mouth need at least 20-30 minutes  to start working.  -Do not drive or drink alcohol while taking pain medicine.  -Pain medication can upset your stomach.  Taking them with a little food may help.  -Other ways to help control pain: elevation, ice, and relaxation  -Call your surgeon if still having unmanageable pain an hour after taking pain medicine.  -Pain medicine can cause constipation.  Taking an over-the counter stool softener while on prescription pain medicine and drinking plenty of fluids can prevent this side effect.  -Call your surgeon if you have severe side effects like: breathing problems, trouble waking up, dizziness, confusion, or severe constipation.    NAUSEA  -Some people have nausea after surgery.  This is often because of anesthesia, pain, pain medicine, or the stress of surgery.  -Do not push yourself to eat.  Start off with clear liquids and soup.  Slowly move to solid foods.  Don't eat fatty, rich, spicy foods at first.  Eat smaller amounts.  -If you develop persistent nausea and vomiting please notify your surgeon immediately.    BLEEDING  -Different types of surgery require different types of care and dressing changes.  It is important to follow all instructions and advice from your surgeon.  Change dressing as directed.  Call your surgeon for any concerns regarding postop bleeding.    SIGNS OF INFECTION  -Signs of infection include: fever, swelling, drainage, and redness  -Notify your surgeon if you have a fever of 100.4 F (38.0 C) or higher.  -Notify your surgeon if you notice redness, swelling, increased pain, pus, or a foul smell at the incision site.

## 2025-02-21 NOTE — INTERVAL H&P NOTE
The patient has been examined and the H&P has been reviewed:    I concur with the findings and no changes have occurred since H&P was written.    Surgery risks, benefits and alternative options discussed and understood by patient/family.    - No changes since clinic  - Consent obtained in preop, no further questions  - Right side of patient marked and confirmed  - To OR for right URS/LL      Active Hospital Problems    Diagnosis  POA    *Right ureteral stone [N20.1]  Yes      Resolved Hospital Problems   No resolved problems to display.

## 2025-03-19 ENCOUNTER — HOSPITAL ENCOUNTER (OUTPATIENT)
Dept: RADIOLOGY | Facility: HOSPITAL | Age: 75
Discharge: HOME | End: 2025-03-19
Attending: STUDENT IN AN ORGANIZED HEALTH CARE EDUCATION/TRAINING PROGRAM
Payer: COMMERCIAL

## 2025-03-19 ENCOUNTER — OFFICE VISIT (OUTPATIENT)
Dept: FAMILY MEDICINE | Facility: CLINIC | Age: 75
End: 2025-03-19
Payer: COMMERCIAL

## 2025-03-19 VITALS
SYSTOLIC BLOOD PRESSURE: 126 MMHG | WEIGHT: 149.06 LBS | TEMPERATURE: 99 F | OXYGEN SATURATION: 98 % | DIASTOLIC BLOOD PRESSURE: 74 MMHG | BODY MASS INDEX: 26.41 KG/M2 | HEART RATE: 105 BPM | HEIGHT: 63 IN

## 2025-03-19 DIAGNOSIS — H61.23 BILATERAL IMPACTED CERUMEN: ICD-10-CM

## 2025-03-19 DIAGNOSIS — Z23 NEED FOR DIPHTHERIA-TETANUS-PERTUSSIS (TDAP) VACCINE: ICD-10-CM

## 2025-03-19 DIAGNOSIS — Z78.0 ASYMPTOMATIC MENOPAUSE: ICD-10-CM

## 2025-03-19 DIAGNOSIS — Z13.6 ENCOUNTER FOR SCREENING FOR CARDIOVASCULAR DISORDERS: ICD-10-CM

## 2025-03-19 DIAGNOSIS — R10.9 RIGHT FLANK PAIN: ICD-10-CM

## 2025-03-19 DIAGNOSIS — R91.1 SOLITARY PULMONARY NODULE: ICD-10-CM

## 2025-03-19 DIAGNOSIS — Z12.11 COLON CANCER SCREENING: ICD-10-CM

## 2025-03-19 DIAGNOSIS — Z76.89 ENCOUNTER TO ESTABLISH CARE: Primary | ICD-10-CM

## 2025-03-19 DIAGNOSIS — N20.1 RIGHT URETERAL STONE: ICD-10-CM

## 2025-03-19 DIAGNOSIS — R91.1 PULMONARY NODULE: ICD-10-CM

## 2025-03-19 PROCEDURE — 69210 REMOVE IMPACTED EAR WAX UNI: CPT | Mod: S$GLB,,, | Performed by: FAMILY MEDICINE

## 2025-03-19 PROCEDURE — 76775 US EXAM ABDO BACK WALL LIM: CPT | Mod: TC

## 2025-03-19 PROCEDURE — 1159F MED LIST DOCD IN RCRD: CPT | Mod: CPTII,S$GLB,, | Performed by: FAMILY MEDICINE

## 2025-03-19 PROCEDURE — 99999 PR PBB SHADOW E&M-EST. PATIENT-LVL V: CPT | Mod: PBBFAC,,, | Performed by: FAMILY MEDICINE

## 2025-03-19 PROCEDURE — 90471 IMMUNIZATION ADMIN: CPT | Mod: S$GLB,,, | Performed by: FAMILY MEDICINE

## 2025-03-19 PROCEDURE — 3078F DIAST BP <80 MM HG: CPT | Mod: CPTII,S$GLB,, | Performed by: FAMILY MEDICINE

## 2025-03-19 PROCEDURE — 90715 TDAP VACCINE 7 YRS/> IM: CPT | Mod: S$GLB,,, | Performed by: FAMILY MEDICINE

## 2025-03-19 PROCEDURE — 3008F BODY MASS INDEX DOCD: CPT | Mod: CPTII,S$GLB,, | Performed by: FAMILY MEDICINE

## 2025-03-19 PROCEDURE — 76775 US EXAM ABDO BACK WALL LIM: CPT | Mod: 26,,, | Performed by: RADIOLOGY

## 2025-03-19 PROCEDURE — 3074F SYST BP LT 130 MM HG: CPT | Mod: CPTII,S$GLB,, | Performed by: FAMILY MEDICINE

## 2025-03-19 PROCEDURE — 99387 INIT PM E/M NEW PAT 65+ YRS: CPT | Mod: 25,S$GLB,, | Performed by: FAMILY MEDICINE

## 2025-03-19 NOTE — PATIENT INSTRUCTIONS
We are out of stock with the Prevnar 20 pneumonia vaccine.  You could get this at the pharmacy at your convenience.  This is a 1 time injection and you do not need any further pneumonia vaccinations after this.    Orders Placed This Encounter   Procedures    DXA Bone Density Axial Skeleton 1 or more sites    CT Chest Without Contrast    CBC Auto Differential    Comprehensive Metabolic Panel    Lipid Panel    TSH    Hemoglobin A1C    Ambulatory referral/consult to Endo Procedure

## 2025-03-19 NOTE — PROGRESS NOTES
(Portions of this note were dictated using voice recognition software and may contain dictation related errors in spelling/grammar/syntax not found on text review)    CC:  Establish care, lung nodule noted    HPI: 74 y.o. female new patient    Seen by urology for kidney stones, had cystoureteroscopy with retrograde pyelogram and ureteral stent 02/21/2025.  I will ultrasound done today showing few right renal parenchymal calcifications or nonobstructing stones measuring 5 mm.  Marked right-sided hydronephrosis with cortical thinning predominantly involving mid lower pole.  Similar findings to prior CT scan 02/05/2025    Incidentally CT from February of 2025 noted a 4 mm pulmonary nodule right middle lobe.  Recommending for lower patient even less than 6 mm no follow up needed.  Or and high-risk patient follow up with noncontrast CT at 12 months recommended.  She is a lifetime nonsmoker.  Also had some liver hypodensities subcentimeter likely cysts    No recent labs     Will stay between [place] and [facility].  She works at [employer] for [employer].  She stays locally when she is working, has a scheduled for some of the time she is here and some of the time she is back home.    Does admit to poor diet        Past Medical History:   Diagnosis Date    Allergy     Hyperlipidemia 12/19/2024    Kidney stone        Past Surgical History:   Procedure Laterality Date    CYSTOURETEROSCOPY WITH RETROGRADE PYELOGRAPHY AND INSERTION OF STENT INTO URETER Right 02/21/2025    Procedure: CYSTOURETEROSCOPY, WITH RETROGRADE PYELOGRAM AND URETERAL STENT INSERTION;  Surgeon: Paco Valdez MD;  Location: [facility];  Service: Urology;  Laterality: Right;  Please have semirigid ureteroscope, digital flexible ureteroscope, rubia laser, and tech (Petey) if available.    LITHOTRIPSY      TUBAL LIGATION         Family History   Problem Relation Name Age of Onset    Parkinsonism Father      Brain cancer Brother      Heart disease  "Neg Hx      Diabetes Neg Hx      Stroke Neg Hx         Social History     Tobacco Use    Smoking status: Never     Passive exposure: Never    Smokeless tobacco: Never   Substance Use Topics    Alcohol use: Never    Drug use: Never       No results found for: "WBC", "HGB", "HCT", "MCV", "PLT", "CHOL", "TRIG", "HDL", "LDLDIRECT", "ALT", "AST", "BILITOT", "ALKPHOS", "NA", "K", "CL", "CREATININE", "ESTGFRAFRICA", "EGFRNONAA", "EGFRNORACEVR", "CALCIUM", "ALBUMIN", "BUN", "CO2", "TSH", "PSA", "PSADIAG", "INR", "LABA1C", "HGBA1C", "MICROALBUR", "MICALBCREAT", "LDLCALC", "GLU", "[id number]"              Vital signs reviewed  Vitals:    03/19/25 1437   BP: 126/74   BP Location: Left arm   Patient Position: Sitting   Pulse: 105   Temp: 98.8 °F (37.1 °C)   TempSrc: Oral   SpO2: 98%   Weight: 67.6 kg (149 lb 0.5 oz)   Height: 5' 3" (1.6 m)       Wt Readings from Last 4 Encounters:   03/19/25 67.6 kg (149 lb 0.5 oz)   02/21/25 65.8 kg (145 lb)   02/07/25 69 kg (152 lb 1.9 oz)   12/30/24 67.9 kg (149 lb 12.8 oz)       PE:   APPEARANCE: Well nourished, well developed, in no acute distress.    HEAD: Normocephalic, atraumatic.  EYES: PERRL. EOMI.   Conjunctivae noninjected.  EARS:  Cerumen bilaterally  NOSE: Mucosa pink. Airway clear.  MOUTH & THROAT: No tonsillar enlargement. No pharyngeal erythema or exudate.   NECK: Supple with no cervical lymphadenopathy.  No carotid bruits.  No thyromegaly  CHEST: Good inspiratory effort. Lungs clear to auscultation with no wheezes or crackles.  CARDIOVASCULAR: Normal S1, S2. No rubs, murmurs, or gallops.  ABDOMEN: Bowel sounds normal. Not distended. Soft. No tenderness or masses. No organomegaly.  EXTREMITIES: No edema       IMPRESSION  1. Encounter to establish care    2. Pulmonary nodule    3. Encounter for screening for cardiovascular disorders    4. Colon cancer screening    5. Asymptomatic menopause    6. Need for diphtheria-tetanus-pertussis (Tdap) vaccine    7. Solitary pulmonary " nodule    8. Bilateral impacted cerumen            PLAN  Orders Placed This Encounter   Procedures    DXA Bone Density Axial Skeleton 1 or more sites    CT Chest Without Contrast    CBC Auto Differential    Comprehensive Metabolic Panel    Lipid Panel    TSH    Hemoglobin A1C    Ambulatory referral/consult to Endo Procedure      Medications Ordered This Encounter   Medications    Tdap (BOOSTRIX) vaccine injection 0.5 mL      Blood pressure stable     Screening labs ordered    Reassurance regarding lung and liver imaging.  Low risk status for lung cancer, never smoked.  Discussed current guidelines less than 6 mm nodule in a low risk patient requires no further follow up although she feels comfortable doing another repeat CT in a year    Candido, has been trying Debrox over-the-counter daily for 7 days.  Has had some vertigo issues.  Interested in debridement and irrigation today.  Both ears cleared with mix of curette/irrigation after verbal consent for procedure.    SCREENINGS      Immunizations:   Zoster up-to-date   Tdap today  Prevnar 20:  Out of stock, can pursue at follow-up visit or can get at the pharmacy.  COVID declines booster      Age/demographic appropriate health maintenance:  Mammogram about six-months ago in [place]  Colon cancer screening : Ordered  DEXA : Ordered

## 2025-03-19 NOTE — PROGRESS NOTES
Administrations This Visit       Tdap (BOOSTRIX) vaccine injection 0.5 mL       Admin Date  03/19/2025 Action  Given Dose  0.5 mL Route  Intramuscular Documented By  Chikis Lawrence, CMA

## 2025-03-24 ENCOUNTER — HOSPITAL ENCOUNTER (OUTPATIENT)
Dept: RADIOLOGY | Facility: HOSPITAL | Age: 75
Discharge: HOME | End: 2025-03-24
Attending: FAMILY MEDICINE
Payer: COMMERCIAL

## 2025-03-24 ENCOUNTER — RESULTS FOLLOW-UP (OUTPATIENT)
Dept: FAMILY MEDICINE | Facility: CLINIC | Age: 75
End: 2025-03-24

## 2025-03-24 ENCOUNTER — OFFICE VISIT (OUTPATIENT)
Dept: UROLOGY | Facility: CLINIC | Age: 75
End: 2025-03-24
Payer: COMMERCIAL

## 2025-03-24 VITALS
SYSTOLIC BLOOD PRESSURE: 123 MMHG | DIASTOLIC BLOOD PRESSURE: 78 MMHG | HEART RATE: 82 BPM | BODY MASS INDEX: 26.83 KG/M2 | WEIGHT: 151.44 LBS | HEIGHT: 63 IN

## 2025-03-24 DIAGNOSIS — N13.2 HYDRONEPHROSIS WITH URINARY OBSTRUCTION DUE TO URETERAL CALCULUS: ICD-10-CM

## 2025-03-24 DIAGNOSIS — Z78.0 ASYMPTOMATIC MENOPAUSE: ICD-10-CM

## 2025-03-24 DIAGNOSIS — R10.9 RIGHT FLANK PAIN: Primary | ICD-10-CM

## 2025-03-24 PROBLEM — M85.80 OSTEOPENIA: Status: ACTIVE | Noted: 2025-01-01

## 2025-03-24 PROCEDURE — 3288F FALL RISK ASSESSMENT DOCD: CPT | Mod: CPTII,S$GLB,, | Performed by: UROLOGY

## 2025-03-24 PROCEDURE — 1125F AMNT PAIN NOTED PAIN PRSNT: CPT | Mod: CPTII,S$GLB,, | Performed by: UROLOGY

## 2025-03-24 PROCEDURE — 1159F MED LIST DOCD IN RCRD: CPT | Mod: CPTII,S$GLB,, | Performed by: UROLOGY

## 2025-03-24 PROCEDURE — 77080 DXA BONE DENSITY AXIAL: CPT | Mod: TC

## 2025-03-24 PROCEDURE — 99214 OFFICE O/P EST MOD 30 MIN: CPT | Mod: S$GLB,,, | Performed by: UROLOGY

## 2025-03-24 PROCEDURE — 3074F SYST BP LT 130 MM HG: CPT | Mod: CPTII,S$GLB,, | Performed by: UROLOGY

## 2025-03-24 PROCEDURE — 1160F RVW MEDS BY RX/DR IN RCRD: CPT | Mod: CPTII,S$GLB,, | Performed by: UROLOGY

## 2025-03-24 PROCEDURE — 99999 PR PBB SHADOW E&M-EST. PATIENT-LVL III: CPT | Mod: PBBFAC,,, | Performed by: UROLOGY

## 2025-03-24 PROCEDURE — 77080 DXA BONE DENSITY AXIAL: CPT | Mod: 26,,, | Performed by: RADIOLOGY

## 2025-03-24 PROCEDURE — 3078F DIAST BP <80 MM HG: CPT | Mod: CPTII,S$GLB,, | Performed by: UROLOGY

## 2025-03-24 PROCEDURE — 3008F BODY MASS INDEX DOCD: CPT | Mod: CPTII,S$GLB,, | Performed by: UROLOGY

## 2025-03-24 PROCEDURE — 1101F PT FALLS ASSESS-DOCD LE1/YR: CPT | Mod: CPTII,S$GLB,, | Performed by: UROLOGY

## 2025-03-24 NOTE — PROGRESS NOTES
[place] - Urology   Clinic Note    SUBJECTIVE:     Chief Complaint   Patient presents with    Pre-op Exam       Referral from: No ref. provider found.    History of Present Illness:  Alize Vernon is a 74 y.o. female who presents to clinic for right ureteral stone.    Patient is here for evaluation of a right ureteral stone.  She was previously seen in the emergency department with right colicky flank pain.  Was found to have a distal right ureteral calculi and severe right hydronephrosis.  Currently states the pain is relatively well-controlled.  Reports that she has had stones in the past.  No fevers.  No chills.    3/24/2025  Underwent right ureteroscopy on 2/21/25.  Has a duplicated system bilaterally.  No stone was seen.  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   Here to review post-op imaging.  Continues to have significant right hydronephrosis and cortical thinning of the mid-lower pole.      Renal scan shows 70% differential function on the left, 30% on right.  The right kidney only upper pole is functioning with photopenic lower pole and filling of activity likely cystic than hydronephrosis and upper pole ureter is not obstructed post lasix emptying. T1/2 on the right was 14 minutes.        Patient endorses no additional complaints at this time.    Past Medical History:   Diagnosis Date    Allergy     Hyperlipidemia 12/19/2024    Kidney stone     Osteopenia 2025       Past Surgical History:   Procedure Laterality Date    CYSTOURETEROSCOPY WITH RETROGRADE PYELOGRAPHY AND INSERTION OF STENT INTO URETER Right 02/21/2025    Procedure: CYSTOURETEROSCOPY, WITH RETROGRADE PYELOGRAM AND URETERAL STENT INSERTION;  Surgeon: Paco Valdez MD;  Location: [facility];  Service: Urology;  Laterality: Right;  Please have semirigid ureteroscope, digital flexible ureteroscope, rubia laser, and tech (Petey) if available.    LITHOTRIPSY      TUBAL LIGATION         Family History  "  Problem Relation Name Age of Onset    Parkinsonism Father      Brain cancer Brother      Heart disease Neg Hx      Diabetes Neg Hx      Stroke Neg Hx         Social History     Tobacco Use    Smoking status: Never     Passive exposure: Never    Smokeless tobacco: Never   Substance Use Topics    Alcohol use: Never    Drug use: Never       Current Outpatient Medications on File Prior to Visit   Medication Sig Dispense Refill    tamsulosin (FLOMAX) 0.4 mg Cap Take 1 capsule (0.4 mg total) by mouth once daily. 30 capsule 1     No current facility-administered medications on file prior to visit.       Review of patient's allergies indicates:   Allergen Reactions    Codeine Rash       Review of Systems:  A review of 10+ systems was conducted with pertinent positive and negative findings documented in HPI with all other systems reviewed and negative.    OBJECTIVE:     Estimated body mass index is 26.83 kg/m² as calculated from the following:    Height as of this encounter: 5' 3" (1.6 m).    Weight as of this encounter: 68.7 kg (151 lb 7.3 oz).    Vital Signs (Most Recent)  Vitals:    03/24/25 1019   BP: 123/78   Pulse: 82         Physical Exam:  GENERAL: patient sitting comfortably  HEENT: normocephalic  NECK: supple, no JVD  PULM: normal chest rise, no increased WOB  HEART: non-diaphoretic  ABDO: soft, nondistended, nontender  BACK: no CVA tenderness bilaterally  SKIN: warm, dry, well perfused  EXT: no bruising or edema  NEURO: grossly normal with no focal deficits  PSYCH: appropriate mood and affect    Genitourinary Exam:  deferred    Lab Results   Component Value Date    WBC 6.82 03/24/2025    HGB 14.0 03/24/2025    HCT 44.9 03/24/2025     03/24/2025        Imaging:  I have personally reviewed all relevant imaging studies.    Results for orders placed or performed during the hospital encounter of 02/05/25 (from the past 2160 hours)   CT Renal Stone Study ABD Pelvis WO    Narrative    EXAMINATION:  CT RENAL " STONE STUDY ABD PELVIS WO    CLINICAL HISTORY:  Flank pain, kidney stone suspected; Generalized abdominal pain    TECHNIQUE:  Low dose axial images, sagittal and coronal reformations were obtained from the lung bases to the pubic symphysis.  Contrast was not administered.    COMPARISON:  CT abdomen pelvis from 12/19/2024    FINDINGS:  Heart: No pericardial effusion.    Lung Bases: 4 mm nodule in the right middle lobe (series 2, image 8).    Liver: Normal in size and attenuation.  A few subcentimeter hypodensities within the liver, too small to characterize favored to represent cysts.    Gallbladder: No calcified gallstones.    Bile Ducts: No evidence of dilated ducts.    Pancreas: No mass or peripancreatic fat stranding.    Spleen: Normal in size.  Accessory splenule present.  A few punctate calcifications consistent with prior granulomatous disease.    Adrenals: Unremarkable.    Kidneys/ Ureters: Left kidney is normal in appearance with no evidence of renal stones, or hydronephrosis.  Previously identified hypodensity in the right kidney measures 8 mm in demonstrates 10 UA shin higher than simple cyst (series 2, image 80).    Right kidney demonstrates severe hydronephrosis of the inferior pole and severe hydroureteronephrosis.  A few stones are visualized in the inferior pole of the right kidney.  Multiple stones are visualized within the distal right ureter measuring up to 5 mm.  The superior pole demonstrates no significant hydronephrosis.  There is a 1.7 x 1.1 cm nonobstructing stone in the midpole of the right kidney.  Additional punctate 2 mm nonobstructing stone in the superior pole.  Findings may represent a duplicated collecting system noting no additional ureter is visualized.  Findings are similar compared to prior exam.    Bladder: No evidence of wall thickening.    Reproductive organs: Unremarkable.    GI Tract/Mesentery: No evidence of bowel obstruction or inflammation.    Lymph nodes: No  lymphadenopathy.    Vasculature: No aneurysm.    Abdominal wall: Unremarkable.    Bones: No acute fracture.      Impression    Severe hydronephrosis with cortical thinning of the inferior pole of the right kidney similar to prior exam with multiple stones in the right distal ureter measuring up to 5 mm.  The superior pole demonstrates no evidence of hydronephrosis and is favored to represent a duplicated collecting system, however a ureter extending from the superior pole is not definitively visualized.    0.8 cm indeterminate lesion in the inferior pole of the left kidney demonstrating attenuation higher than simple cyst.  Retroperitoneal ultrasound is recommended for further evaluation.    4 mm pulmonary nodule in the right middle lobe.  For a solid nodule <6 mm, Fleischner Society 2017 guidelines recommend no routine follow up for a low risk patient, or follow-up with non-contrast chest CT at 12 months in a high risk patient.      Electronically signed by: Nicola Gaming MD  Date:    02/05/2025  Time:    13:01     No results found for this or any previous visit (from the past 2160 hours).  DXA Bone Density Axial Skeleton 1 or more sites  Narrative: EXAMINATION:  DXA BONE DENSITY AXIAL SKELETON 1 OR MORE SITES    CLINICAL HISTORY:  Asymptomatic menopausal state    TECHNIQUE:  DXA scanning was performed over the hips and lumbar spine.  Review of the images confirms satisfactory positioning and technique.    COMPARISON:  None    FINDINGS:  The L1 to L4 vertebral bone mineral density is equal to 0.956 g/cm squared with a T score of -1.9.    The left femoral neck bone mineral density is equal to 0.827 g/cm squared with a T score of -1.5.    The right femoral neck bone mineral density is equal to 0.761 g/cm squared with a T score of -2.0.    There is a 5.7% risk of a major osteoporotic fracture and a 1.2% risk of hip fracture in the next 10 years (FRAX).  Impression: Osteopenia.    Consider FDA approved medical  "therapies in postmenopausal women and men aged 50 years and older, based on the following:    *A hip or vertebral (clinical or morphometric) fracture  *T score less than or equal to -2.5 at the femoral neck or spine after appropriate evaluation to exclude secondary causes.  *Low bone mass -- also known as osteopenia (T score between -1.0 and -2.5 at the femoral neck or spine) and a 10 year probability of hip fracture greater than or equal to 3% or a 10 year probability of major osteoporosis-related fracture greater than or equal to 20% based on the [place]-adapted WHO algorithm.  *Clinicians judgment and/or patient preference may indicate treatment for people with 10 year fracture probabilities is above or below these levels.    Electronically signed by: Cirilo Dutta  Date:    03/24/2025  Time:    09:45       PSA:  No results found for: "PSA", "PSADIAG", "PSATOTAL", "PSAFREE"    Testosterone:  No results found for: "TOTALTESTOST", "TESTOSTERONE"     ASSESSMENT     1. Right flank pain    2. Hydronephrosis with urinary obstruction due to ureteral calculus          PLAN:     Imaging reviewed.  Patient does have what appears to be severe hydronephrosis however it appears that the lower pole is atrophic however the remainder of the kidney appears normal functioning.  Additionally she has a J hooking ureter.  It is unclear if the hydronephrosis is chronic.       Paco Valdez MD  Urology  [facility]    Disclaimer: This note has been generated using voice-recognition software. There may be typographical errors that have been missed during proof-reading.       "

## 2025-04-28 ENCOUNTER — CLINICAL SUPPORT (OUTPATIENT)
Dept: ENDOSCOPY | Facility: HOSPITAL | Age: 75
End: 2025-04-28
Attending: FAMILY MEDICINE
Payer: COMMERCIAL

## 2025-04-28 ENCOUNTER — TELEPHONE (OUTPATIENT)
Dept: ENDOSCOPY | Facility: HOSPITAL | Age: 75
End: 2025-04-28

## 2025-04-28 VITALS — BODY MASS INDEX: 26.58 KG/M2 | HEIGHT: 63 IN | WEIGHT: 150 LBS

## 2025-04-28 DIAGNOSIS — Z12.11 COLON CANCER SCREENING: ICD-10-CM

## 2025-04-28 DIAGNOSIS — Z12.11 SPECIAL SCREENING FOR MALIGNANT NEOPLASMS, COLON: Primary | ICD-10-CM

## 2025-04-28 NOTE — TELEPHONE ENCOUNTER
Referral for procedure from PAT appointment      Spoke to patient to schedule procedure(s) Colonoscopy       Physician to perform procedure(s) Dr. Alexis  Date of Procedure (s) 5/29/25  Arrival Time 8:45 AM  Time of Procedure(s) 9:45 AM   Location of Procedure(s) [facility] [address] [facility]  Type of Rx Prep sent to patient: PEG  Instructions provided to patient via [facility]    Patient was informed on the following information and verbalized understanding. Screening questionnaire reviewed with patient and complete. If procedure requires anesthesia, a responsible adult needs to be present to accompany the patient home, patient cannot drive after receiving anesthesia. Appointment details are tentative, especially check-in time. Patient will receive a prep-op call 7 days prior to confirm check-in time for procedure. If applicable the patient should contact their pharmacy to verify Rx for procedure prep is ready for pick-up. Patient was advised to call the scheduling department at [phone] if pharmacy states no Rx is available. Patient was advised to call the endoscopy scheduling department if any questions or concerns arise.      [facility] Scheduling Department

## 2025-05-27 ENCOUNTER — TELEPHONE (OUTPATIENT)
Dept: ENDOSCOPY | Facility: HOSPITAL | Age: 75
End: 2025-05-27
Payer: COMMERCIAL

## 2025-05-27 NOTE — TELEPHONE ENCOUNTER
Spoke with pt to verify arrival time and date of procedure.May 29,2025 for 0845 the patient confirmed. Doesn't take any blood thinners or weight loss medications.

## 2025-05-29 ENCOUNTER — TELEPHONE (OUTPATIENT)
Dept: ENDOSCOPY | Facility: HOSPITAL | Age: 75
End: 2025-05-29
Payer: COMMERCIAL

## 2025-05-29 ENCOUNTER — TELEPHONE (OUTPATIENT)
Dept: GASTROENTEROLOGY | Facility: CLINIC | Age: 75
End: 2025-05-29
Payer: COMMERCIAL

## 2025-05-29 NOTE — TELEPHONE ENCOUNTER
----- Message from Dona sent at 5/28/2025  7:17 PM CDT -----  Regarding: PT ADVICE  Contact: PT  Pt called to cancel colonoscopy due to not being able to complete prep.Please advise. Pt can be reached at [phone]

## 2025-05-29 NOTE — TELEPHONE ENCOUNTER
Called pt to re-schedule colonoscopy. Pt was driving. Pt will call back to re-schedule when she has available dates. Pt stated unable to tolerate peg prep.

## (undated) DEVICE — JELLY LUBRICANT STERILE 4 OZ

## (undated) DEVICE — DRAPE T CYSTOSCOPY STERILE

## (undated) DEVICE — GOWN POLY REINF X-LONG 2XL

## (undated) DEVICE — SET IRR URLGY 2LINE UNIV SPIKE

## (undated) DEVICE — SOL IRR NACL .9% 3000ML

## (undated) DEVICE — Device

## (undated) DEVICE — GLOVE BIOGEL SKINSENSE PI 7.0

## (undated) DEVICE — CONTAINER MULTIPURPOSE/SPECIME

## (undated) DEVICE — GUIDEWIRE NITINOL HYBRID 150CM

## (undated) DEVICE — SYR ONLY LUER LOCK 20CC

## (undated) DEVICE — BAG PRESSURE INFUSER 3000CC

## (undated) DEVICE — SCRUB DYNA-HEX LIQ 4% CHG 4OZ

## (undated) DEVICE — SENSOR DUAL FLEX STR 150CM

## (undated) DEVICE — SPONGE COTTON TRAY 4X4IN

## (undated) DEVICE — TOWEL OR XRAY BLUE 17X26IN

## (undated) DEVICE — CATH URTRL OPEN END STR TIP 5F

## (undated) DEVICE — KIT CATH URTRL FLEXIMA 5FR

## (undated) DEVICE — COVER TABLE HVY DTY 60X90IN

## (undated) DEVICE — BAG LINGEMAN DRAIN UROLOGY